# Patient Record
Sex: MALE | Race: WHITE | NOT HISPANIC OR LATINO
[De-identification: names, ages, dates, MRNs, and addresses within clinical notes are randomized per-mention and may not be internally consistent; named-entity substitution may affect disease eponyms.]

---

## 2019-03-04 ENCOUNTER — APPOINTMENT (OUTPATIENT)
Dept: ORTHOPEDIC SURGERY | Facility: CLINIC | Age: 69
End: 2019-03-04
Payer: MEDICARE

## 2019-03-04 VITALS
SYSTOLIC BLOOD PRESSURE: 145 MMHG | BODY MASS INDEX: 35.55 KG/M2 | HEIGHT: 69 IN | WEIGHT: 240 LBS | DIASTOLIC BLOOD PRESSURE: 79 MMHG

## 2019-03-04 VITALS — BODY MASS INDEX: 35.55 KG/M2 | HEIGHT: 69 IN | WEIGHT: 240 LBS

## 2019-03-04 DIAGNOSIS — Z87.39 PERSONAL HISTORY OF OTHER DISEASES OF THE MUSCULOSKELETAL SYSTEM AND CONNECTIVE TISSUE: ICD-10-CM

## 2019-03-04 DIAGNOSIS — Z82.61 FAMILY HISTORY OF ARTHRITIS: ICD-10-CM

## 2019-03-04 PROCEDURE — 72110 X-RAY EXAM L-2 SPINE 4/>VWS: CPT

## 2019-03-04 PROCEDURE — 73562 X-RAY EXAM OF KNEE 3: CPT | Mod: RT

## 2019-03-04 PROCEDURE — 99204 OFFICE O/P NEW MOD 45 MIN: CPT

## 2019-03-04 NOTE — DISCUSSION/SUMMARY
[Medication Risks Reviewed] : Medication risks reviewed [Surgical risks reviewed] : Surgical risks reviewed [de-identified] : MRI prescription of the lumbar spine to rule out herniated nucleus pulposus and evaluate generative disc disease.\par Ultrasound-guided steroid joint injection description was provided to the patient today or diagnostic evaluation\par When patient feels that the pain is intolerable with activity of daily living, discussed the risks and benefits of a right total her placement.

## 2019-03-04 NOTE — PHYSICAL EXAM
[Antalgic] : antalgic [Coxalgic] : coxalgic [Normal RLE] : Right Lower Extremity: No scars, rashes, lesions, ulcers, skin intact [Normal LLE] : Left Lower Extremity: No scars, rashes, lesions, ulcers, skin intact [Normal Touch] : sensation intact for touch [UE/LE] : Sensory: Intact in bilateral upper & lower extremities [ALL] : dorsalis pedis, posterior tibial, femoral, popliteal, and radial 2+ and symmetric bilaterally [Normal] : Alert and in no acute distress [Sacroiliac Mike-Fabere Test Left Side] : negative Marisol [Left Hip Was Examined] : negative impingement test [Poor Appearance] : well-appearing [Acute Distress] : not in acute distress [Abl Affect] : with normal affect [de-identified] : Well healed surgical incision of the right TKR with no signs of cellulitis. Full range of motion of the right total knee replacement in full extension and flexion to 120° no instability is noted with tibial stress [de-identified] : Motion of the right hip demonstrating decreased passive and active range of motion with impingement noted upon internal rotation of the femur. Upon flexion of that right hip which immediately externally rotates upon flexion and painful with rotation. [de-identified] : Negative straight leg raise\par heel and toe walk is intact [de-identified] : Deniz BMI [de-identified] : Radiographs of the right knee were performed today. There are stable interfaces between bone and implants in the femur, tibia and patella  are well in stable positioning of the femoral, tibia and, patella components. Alignment of the femur with the tibia are good, and the alignment of the patella to the femoral groove are well aligned. There is no fracture, foreign body, subsidence, osteolysis, or notable effusions. \par \par AP pelvis and lateral right hip demonstrating grade 3 degenerative joint disease with decreased joint space in comparison to the left hip joint. Osteophytes extending out from both the inferior and superior aspect of the right hip joint. Noted to send lesion of the femoral head aligned well with the acetabulum subchondral cyst formation. No fractures

## 2019-03-04 NOTE — HISTORY OF PRESENT ILLNESS
[Worsening] : worsening [___ wks] : [unfilled] week(s) ago [4] : a minimum pain level of 4/10 [7] : a maximum pain level of 7/10 [Standing] : standing [Constant] : ~He/She~ states the symptoms seem to be constant [Bending] : worsened by bending [Direct Pressure] : worsened by direct pressure [Hip Movement] : worsened by hip movement [Walking] : worsened by walking [NSAIDs] : relieved by nonsteroidal anti-inflammatory drugs [Rest] : relieved by rest [de-identified] : Patient is known to us for status post right total knee replacement in approximately 2012. Initial visit today patient is coming in for complaints of right knee pain which has been going on for several weeks. No known injury. Takes Mobic or Aleve as needed with no relief of pain. Lower back pain and into the entire right leg. Patient is here to evaluate his right total knee replacement.

## 2019-03-06 ENCOUNTER — FORM ENCOUNTER (OUTPATIENT)
Age: 69
End: 2019-03-06

## 2019-03-07 ENCOUNTER — OUTPATIENT (OUTPATIENT)
Dept: OUTPATIENT SERVICES | Facility: HOSPITAL | Age: 69
LOS: 1 days | End: 2019-03-07
Payer: MEDICARE

## 2019-03-07 ENCOUNTER — APPOINTMENT (OUTPATIENT)
Dept: MRI IMAGING | Facility: CLINIC | Age: 69
End: 2019-03-07
Payer: MEDICARE

## 2019-03-07 ENCOUNTER — APPOINTMENT (OUTPATIENT)
Dept: RADIOLOGY | Facility: CLINIC | Age: 69
End: 2019-03-07
Payer: MEDICARE

## 2019-03-07 DIAGNOSIS — Z00.8 ENCOUNTER FOR OTHER GENERAL EXAMINATION: ICD-10-CM

## 2019-03-07 PROCEDURE — 27093 INJECTION FOR HIP X-RAY: CPT | Mod: RT

## 2019-03-07 PROCEDURE — 73525 CONTRAST X-RAY OF HIP: CPT

## 2019-03-07 PROCEDURE — 72148 MRI LUMBAR SPINE W/O DYE: CPT | Mod: 26

## 2019-03-07 PROCEDURE — 73525 CONTRAST X-RAY OF HIP: CPT | Mod: 26,RT

## 2019-03-07 PROCEDURE — 27093 INJECTION FOR HIP X-RAY: CPT

## 2019-03-07 PROCEDURE — 72148 MRI LUMBAR SPINE W/O DYE: CPT

## 2019-03-11 ENCOUNTER — APPOINTMENT (OUTPATIENT)
Dept: ORTHOPEDIC SURGERY | Facility: CLINIC | Age: 69
End: 2019-03-11
Payer: MEDICARE

## 2019-03-11 VITALS — HEIGHT: 69 IN | WEIGHT: 240 LBS | BODY MASS INDEX: 35.55 KG/M2

## 2019-03-11 DIAGNOSIS — M51.36 OTHER INTERVERTEBRAL DISC DEGENERATION, LUMBAR REGION: ICD-10-CM

## 2019-03-11 PROCEDURE — 99213 OFFICE O/P EST LOW 20 MIN: CPT

## 2019-03-11 RX ORDER — DICLOFENAC SODIUM 75 MG/1
75 TABLET, DELAYED RELEASE ORAL
Qty: 60 | Refills: 1 | Status: ACTIVE | COMMUNITY
Start: 2019-03-11 | End: 1900-01-01

## 2019-04-01 ENCOUNTER — APPOINTMENT (OUTPATIENT)
Dept: ORTHOPEDIC SURGERY | Facility: CLINIC | Age: 69
End: 2019-04-01
Payer: MEDICARE

## 2019-04-01 VITALS
HEIGHT: 69 IN | WEIGHT: 250 LBS | HEART RATE: 58 BPM | BODY MASS INDEX: 37.03 KG/M2 | SYSTOLIC BLOOD PRESSURE: 145 MMHG | DIASTOLIC BLOOD PRESSURE: 81 MMHG

## 2019-04-01 PROCEDURE — 99214 OFFICE O/P EST MOD 30 MIN: CPT

## 2019-04-01 RX ORDER — MELOXICAM 7.5 MG/1
7.5 TABLET ORAL
Qty: 30 | Refills: 0 | Status: ACTIVE | COMMUNITY
Start: 2019-01-08

## 2019-04-01 RX ORDER — AMLODIPINE BESYLATE 10 MG/1
10 TABLET ORAL
Qty: 90 | Refills: 0 | Status: ACTIVE | COMMUNITY
Start: 2019-01-08

## 2019-04-01 RX ORDER — SIMVASTATIN 40 MG/1
40 TABLET, FILM COATED ORAL
Qty: 90 | Refills: 0 | Status: ACTIVE | COMMUNITY
Start: 2019-01-08

## 2019-04-01 RX ORDER — METOPROLOL SUCCINATE 100 MG/1
100 TABLET, EXTENDED RELEASE ORAL
Qty: 90 | Refills: 0 | Status: ACTIVE | COMMUNITY
Start: 2019-01-08

## 2019-04-01 RX ORDER — HYDROCHLOROTHIAZIDE 12.5 MG/1
12.5 TABLET ORAL
Qty: 90 | Refills: 0 | Status: ACTIVE | COMMUNITY
Start: 2019-01-08

## 2019-04-01 RX ORDER — VALSARTAN 160 MG/1
160 TABLET, COATED ORAL
Qty: 90 | Refills: 0 | Status: ACTIVE | COMMUNITY
Start: 2019-03-21

## 2019-04-01 RX ORDER — BENAZEPRIL HYDROCHLORIDE 40 MG/1
40 TABLET, FILM COATED ORAL
Qty: 90 | Refills: 0 | Status: ACTIVE | COMMUNITY
Start: 2019-01-08

## 2019-04-01 NOTE — DISCUSSION/SUMMARY
[Medication Risks Reviewed] : Medication risks reviewed [Surgical risks reviewed] : Surgical risks reviewed [de-identified] : The patient is not ready for hip replacement surgery at this time we'll continue with conservative treatment an exercise and stretching and walking. The patient was cautioned against taking multiple anti-inflammatories at the same time given his cardiac history. Activity modifications were discussed with the patient. A referral was made for acupuncture. The patient will follow up with us as needed.

## 2019-04-01 NOTE — PHYSICAL EXAM
[Antalgic] : antalgic [UE/LE] : Sensory: Intact in bilateral upper & lower extremities [ALL] : dorsalis pedis, posterior tibial, femoral, popliteal, and radial 2+ and symmetric bilaterally [Normal RLE] : Right Lower Extremity: No scars, rashes, lesions, ulcers, skin intact [Normal LLE] : Left Lower Extremity: No scars, rashes, lesions, ulcers, skin intact [Normal DTR Reflexes] : DTR reflexes normal [Normal Touch] : sensation intact for touch [Poor Appearance] : well-appearing [Acute Distress] : not in acute distress [Normal] : No costovertebral angle tenderness and no spinal tenderness [de-identified] : Positive right hip Fabers test

## 2019-05-08 ENCOUNTER — APPOINTMENT (OUTPATIENT)
Dept: ORTHOPEDIC SURGERY | Facility: CLINIC | Age: 69
End: 2019-05-08
Payer: MEDICARE

## 2019-05-08 VITALS
HEIGHT: 69 IN | SYSTOLIC BLOOD PRESSURE: 143 MMHG | DIASTOLIC BLOOD PRESSURE: 76 MMHG | WEIGHT: 250 LBS | BODY MASS INDEX: 37.03 KG/M2 | HEART RATE: 62 BPM

## 2019-05-08 PROCEDURE — 99214 OFFICE O/P EST MOD 30 MIN: CPT

## 2019-05-13 ENCOUNTER — APPOINTMENT (OUTPATIENT)
Dept: ORTHOPEDIC SURGERY | Facility: CLINIC | Age: 69
End: 2019-05-13

## 2019-05-28 ENCOUNTER — APPOINTMENT (OUTPATIENT)
Dept: ORTHOPEDIC SURGERY | Facility: CLINIC | Age: 69
End: 2019-05-28
Payer: MEDICARE

## 2019-05-28 VITALS
BODY MASS INDEX: 37.03 KG/M2 | SYSTOLIC BLOOD PRESSURE: 134 MMHG | WEIGHT: 250 LBS | HEART RATE: 58 BPM | DIASTOLIC BLOOD PRESSURE: 69 MMHG | HEIGHT: 69 IN

## 2019-05-28 DIAGNOSIS — M16.11 UNILATERAL PRIMARY OSTEOARTHRITIS, RIGHT HIP: ICD-10-CM

## 2019-05-28 PROCEDURE — 73502 X-RAY EXAM HIP UNI 2-3 VIEWS: CPT

## 2019-05-28 PROCEDURE — 99214 OFFICE O/P EST MOD 30 MIN: CPT

## 2019-06-03 ENCOUNTER — OUTPATIENT (OUTPATIENT)
Dept: OUTPATIENT SERVICES | Facility: HOSPITAL | Age: 69
LOS: 1 days | End: 2019-06-03
Payer: MEDICARE

## 2019-06-03 VITALS
DIASTOLIC BLOOD PRESSURE: 70 MMHG | HEART RATE: 55 BPM | SYSTOLIC BLOOD PRESSURE: 148 MMHG | RESPIRATION RATE: 14 BRPM | WEIGHT: 250 LBS | OXYGEN SATURATION: 97 % | TEMPERATURE: 98 F | HEIGHT: 67 IN

## 2019-06-03 DIAGNOSIS — Z98.890 OTHER SPECIFIED POSTPROCEDURAL STATES: Chronic | ICD-10-CM

## 2019-06-03 DIAGNOSIS — Z96.651 PRESENCE OF RIGHT ARTIFICIAL KNEE JOINT: Chronic | ICD-10-CM

## 2019-06-03 DIAGNOSIS — M16.11 UNILATERAL PRIMARY OSTEOARTHRITIS, RIGHT HIP: ICD-10-CM

## 2019-06-03 DIAGNOSIS — Z01.818 ENCOUNTER FOR OTHER PREPROCEDURAL EXAMINATION: ICD-10-CM

## 2019-06-03 DIAGNOSIS — M25.551 PAIN IN RIGHT HIP: ICD-10-CM

## 2019-06-03 LAB
ALBUMIN SERPL ELPH-MCNC: 4.3 G/DL — SIGNIFICANT CHANGE UP (ref 3.3–5)
ALP SERPL-CCNC: 74 U/L — SIGNIFICANT CHANGE UP (ref 30–120)
ALT FLD-CCNC: 27 U/L DA — SIGNIFICANT CHANGE UP (ref 10–60)
ANION GAP SERPL CALC-SCNC: 7 MMOL/L — SIGNIFICANT CHANGE UP (ref 5–17)
APTT BLD: 31.5 SEC — SIGNIFICANT CHANGE UP (ref 28.5–37)
AST SERPL-CCNC: 18 U/L — SIGNIFICANT CHANGE UP (ref 10–40)
BILIRUB SERPL-MCNC: 0.8 MG/DL — SIGNIFICANT CHANGE UP (ref 0.2–1.2)
BUN SERPL-MCNC: 18 MG/DL — SIGNIFICANT CHANGE UP (ref 7–23)
CALCIUM SERPL-MCNC: 9.3 MG/DL — SIGNIFICANT CHANGE UP (ref 8.4–10.5)
CHLORIDE SERPL-SCNC: 99 MMOL/L — SIGNIFICANT CHANGE UP (ref 96–108)
CO2 SERPL-SCNC: 30 MMOL/L — SIGNIFICANT CHANGE UP (ref 22–31)
CREAT SERPL-MCNC: 0.76 MG/DL — SIGNIFICANT CHANGE UP (ref 0.5–1.3)
GLUCOSE SERPL-MCNC: 102 MG/DL — HIGH (ref 70–99)
HCT VFR BLD CALC: 43 % — SIGNIFICANT CHANGE UP (ref 39–50)
HGB BLD-MCNC: 14.6 G/DL — SIGNIFICANT CHANGE UP (ref 13–17)
INR BLD: 1.04 RATIO — SIGNIFICANT CHANGE UP (ref 0.88–1.16)
MCHC RBC-ENTMCNC: 32.5 PG — SIGNIFICANT CHANGE UP (ref 27–34)
MCHC RBC-ENTMCNC: 34 GM/DL — SIGNIFICANT CHANGE UP (ref 32–36)
MCV RBC AUTO: 95.8 FL — SIGNIFICANT CHANGE UP (ref 80–100)
MRSA PCR RESULT.: SIGNIFICANT CHANGE UP
NRBC # BLD: 0 /100 WBCS — SIGNIFICANT CHANGE UP (ref 0–0)
PLATELET # BLD AUTO: 235 K/UL — SIGNIFICANT CHANGE UP (ref 150–400)
POTASSIUM SERPL-MCNC: 3.7 MMOL/L — SIGNIFICANT CHANGE UP (ref 3.5–5.3)
POTASSIUM SERPL-SCNC: 3.7 MMOL/L — SIGNIFICANT CHANGE UP (ref 3.5–5.3)
PROT SERPL-MCNC: 7.5 G/DL — SIGNIFICANT CHANGE UP (ref 6–8.3)
PROTHROM AB SERPL-ACNC: 11.3 SEC — SIGNIFICANT CHANGE UP (ref 10–12.9)
RBC # BLD: 4.49 M/UL — SIGNIFICANT CHANGE UP (ref 4.2–5.8)
RBC # FLD: 13.3 % — SIGNIFICANT CHANGE UP (ref 10.3–14.5)
S AUREUS DNA NOSE QL NAA+PROBE: DETECTED
SODIUM SERPL-SCNC: 136 MMOL/L — SIGNIFICANT CHANGE UP (ref 135–145)
WBC # BLD: 7.47 K/UL — SIGNIFICANT CHANGE UP (ref 3.8–10.5)
WBC # FLD AUTO: 7.47 K/UL — SIGNIFICANT CHANGE UP (ref 3.8–10.5)

## 2019-06-03 PROCEDURE — 36415 COLL VENOUS BLD VENIPUNCTURE: CPT

## 2019-06-03 PROCEDURE — 93010 ELECTROCARDIOGRAM REPORT: CPT

## 2019-06-03 PROCEDURE — 85610 PROTHROMBIN TIME: CPT

## 2019-06-03 PROCEDURE — 86901 BLOOD TYPING SEROLOGIC RH(D): CPT

## 2019-06-03 PROCEDURE — 80053 COMPREHEN METABOLIC PANEL: CPT

## 2019-06-03 PROCEDURE — 87640 STAPH A DNA AMP PROBE: CPT

## 2019-06-03 PROCEDURE — 87641 MR-STAPH DNA AMP PROBE: CPT

## 2019-06-03 PROCEDURE — 85730 THROMBOPLASTIN TIME PARTIAL: CPT

## 2019-06-03 PROCEDURE — 93005 ELECTROCARDIOGRAM TRACING: CPT

## 2019-06-03 PROCEDURE — 86850 RBC ANTIBODY SCREEN: CPT

## 2019-06-03 PROCEDURE — 86900 BLOOD TYPING SEROLOGIC ABO: CPT

## 2019-06-03 PROCEDURE — 85027 COMPLETE CBC AUTOMATED: CPT

## 2019-06-03 PROCEDURE — G0463: CPT

## 2019-06-03 NOTE — H&P PST ADULT - HISTORY OF PRESENT ILLNESS
this is a 67 y/o male who has had right knee, hip, buttocks pain for about 3 months; tests show right hip is worn out, bone on bone; to have right total hip replacement; takes meloxicam or tylenol for pain

## 2019-06-03 NOTE — H&P PST ADULT - NSICDXPROBLEM_GEN_ALL_CORE_FT
PROBLEM DIAGNOSES  Problem: Right hip pain  Assessment and Plan: right total hip replacement, anterior approach PROBLEM DIAGNOSES  Problem: At risk for sleep apnea  Assessment and Plan: stop bang 5-ROCIO precautions    Problem: Right hip pain  Assessment and Plan: right total hip replacement, anterior approach

## 2019-06-03 NOTE — H&P PST ADULT - NSICDXPASTSURGICALHX_GEN_ALL_CORE_FT
PAST SURGICAL HISTORY:  H/O knee surgery left knee reconstruction 1974    S/P arthroscopy of right knee     S/P total knee replacement, right 2011

## 2019-06-03 NOTE — H&P PST ADULT - NSICDXFAMILYHX_GEN_ALL_CORE_FT
FAMILY HISTORY:  Family history of CHF (congestive heart failure), mother   Family history of heart attack, father age 42  FH: type 2 diabetes, mother  FHx: heart disease, 2 brothers

## 2019-06-04 DIAGNOSIS — Z91.89 OTHER SPECIFIED PERSONAL RISK FACTORS, NOT ELSEWHERE CLASSIFIED: ICD-10-CM

## 2019-06-04 RX ORDER — MUPIROCIN 20 MG/G
1 OINTMENT TOPICAL
Qty: 1 | Refills: 0
Start: 2019-06-04 | End: 2019-06-08

## 2019-06-04 NOTE — PROGRESS NOTE ADULT - SUBJECTIVE AND OBJECTIVE BOX
Nose culture positive for MSSA. Mupirocin ointment 2% escribed and sent to patient's pharmacy and to be used twice a day for 5 consecutive days. Left a message for patient to call back

## 2019-06-04 NOTE — PROVIDER CONTACT NOTE (OTHER) - SITUATION
Patient called back re: nasal culture + MSSA. Instructed patient re: use of mupiricin nasal ointment twice daily for 5 consecutive days and documentation of same. Patient verbalizes understanding.

## 2019-06-13 ENCOUNTER — APPOINTMENT (OUTPATIENT)
Dept: ORTHOPEDIC SURGERY | Facility: HOSPITAL | Age: 69
End: 2019-06-13

## 2019-06-17 RX ORDER — SENNA PLUS 8.6 MG/1
2 TABLET ORAL AT BEDTIME
Refills: 0 | Status: DISCONTINUED | OUTPATIENT
Start: 2019-06-19 | End: 2019-06-21

## 2019-06-17 RX ORDER — ONDANSETRON 8 MG/1
4 TABLET, FILM COATED ORAL EVERY 6 HOURS
Refills: 0 | Status: DISCONTINUED | OUTPATIENT
Start: 2019-06-19 | End: 2019-06-21

## 2019-06-17 RX ORDER — DOCUSATE SODIUM 100 MG
100 CAPSULE ORAL THREE TIMES A DAY
Refills: 0 | Status: DISCONTINUED | OUTPATIENT
Start: 2019-06-19 | End: 2019-06-21

## 2019-06-17 RX ORDER — PANTOPRAZOLE SODIUM 20 MG/1
40 TABLET, DELAYED RELEASE ORAL
Refills: 0 | Status: DISCONTINUED | OUTPATIENT
Start: 2019-06-19 | End: 2019-06-21

## 2019-06-17 RX ORDER — MAGNESIUM HYDROXIDE 400 MG/1
30 TABLET, CHEWABLE ORAL DAILY
Refills: 0 | Status: DISCONTINUED | OUTPATIENT
Start: 2019-06-19 | End: 2019-06-21

## 2019-06-17 RX ORDER — POLYETHYLENE GLYCOL 3350 17 G/17G
17 POWDER, FOR SOLUTION ORAL DAILY
Refills: 0 | Status: DISCONTINUED | OUTPATIENT
Start: 2019-06-19 | End: 2019-06-21

## 2019-06-19 ENCOUNTER — INPATIENT (INPATIENT)
Facility: HOSPITAL | Age: 69
LOS: 1 days | Discharge: HOME | DRG: 470 | End: 2019-06-21
Attending: ORTHOPAEDIC SURGERY | Admitting: ORTHOPAEDIC SURGERY
Payer: MEDICARE

## 2019-06-19 ENCOUNTER — RESULT REVIEW (OUTPATIENT)
Age: 69
End: 2019-06-19

## 2019-06-19 ENCOUNTER — APPOINTMENT (OUTPATIENT)
Dept: ORTHOPEDIC SURGERY | Facility: HOSPITAL | Age: 69
End: 2019-06-19

## 2019-06-19 VITALS
HEART RATE: 64 BPM | HEIGHT: 67 IN | RESPIRATION RATE: 31 BRPM | TEMPERATURE: 98 F | OXYGEN SATURATION: 97 % | DIASTOLIC BLOOD PRESSURE: 68 MMHG | WEIGHT: 245.82 LBS | SYSTOLIC BLOOD PRESSURE: 160 MMHG

## 2019-06-19 DIAGNOSIS — Z01.818 ENCOUNTER FOR OTHER PREPROCEDURAL EXAMINATION: ICD-10-CM

## 2019-06-19 DIAGNOSIS — Z96.651 PRESENCE OF RIGHT ARTIFICIAL KNEE JOINT: Chronic | ICD-10-CM

## 2019-06-19 DIAGNOSIS — M16.11 UNILATERAL PRIMARY OSTEOARTHRITIS, RIGHT HIP: ICD-10-CM

## 2019-06-19 DIAGNOSIS — Z98.890 OTHER SPECIFIED POSTPROCEDURAL STATES: Chronic | ICD-10-CM

## 2019-06-19 PROBLEM — E78.5 HYPERLIPIDEMIA, UNSPECIFIED: Chronic | Status: ACTIVE | Noted: 2019-06-03

## 2019-06-19 PROBLEM — M19.90 UNSPECIFIED OSTEOARTHRITIS, UNSPECIFIED SITE: Chronic | Status: ACTIVE | Noted: 2019-06-03

## 2019-06-19 PROBLEM — I10 ESSENTIAL (PRIMARY) HYPERTENSION: Chronic | Status: ACTIVE | Noted: 2019-06-03

## 2019-06-19 LAB
ANION GAP SERPL CALC-SCNC: 2 MMOL/L — LOW (ref 5–17)
BUN SERPL-MCNC: 15 MG/DL — SIGNIFICANT CHANGE UP (ref 7–23)
CALCIUM SERPL-MCNC: 8.3 MG/DL — LOW (ref 8.4–10.5)
CHLORIDE SERPL-SCNC: 101 MMOL/L — SIGNIFICANT CHANGE UP (ref 96–108)
CO2 SERPL-SCNC: 31 MMOL/L — SIGNIFICANT CHANGE UP (ref 22–31)
CREAT SERPL-MCNC: 0.55 MG/DL — SIGNIFICANT CHANGE UP (ref 0.5–1.3)
GLUCOSE SERPL-MCNC: 126 MG/DL — HIGH (ref 70–99)
HCT VFR BLD CALC: 35.2 % — LOW (ref 39–50)
HGB BLD-MCNC: 11.9 G/DL — LOW (ref 13–17)
MCHC RBC-ENTMCNC: 33.1 PG — SIGNIFICANT CHANGE UP (ref 27–34)
MCHC RBC-ENTMCNC: 33.8 GM/DL — SIGNIFICANT CHANGE UP (ref 32–36)
MCV RBC AUTO: 97.8 FL — SIGNIFICANT CHANGE UP (ref 80–100)
NRBC # BLD: 0 /100 WBCS — SIGNIFICANT CHANGE UP (ref 0–0)
PLATELET # BLD AUTO: 252 K/UL — SIGNIFICANT CHANGE UP (ref 150–400)
POTASSIUM SERPL-MCNC: 3.7 MMOL/L — SIGNIFICANT CHANGE UP (ref 3.5–5.3)
POTASSIUM SERPL-SCNC: 3.7 MMOL/L — SIGNIFICANT CHANGE UP (ref 3.5–5.3)
RBC # BLD: 3.6 M/UL — LOW (ref 4.2–5.8)
RBC # FLD: 13.4 % — SIGNIFICANT CHANGE UP (ref 10.3–14.5)
SODIUM SERPL-SCNC: 134 MMOL/L — LOW (ref 135–145)
WBC # BLD: 13.58 K/UL — HIGH (ref 3.8–10.5)
WBC # FLD AUTO: 13.58 K/UL — HIGH (ref 3.8–10.5)

## 2019-06-19 PROCEDURE — 88311 DECALCIFY TISSUE: CPT | Mod: 26

## 2019-06-19 PROCEDURE — 27130 TOTAL HIP ARTHROPLASTY: CPT | Mod: RT

## 2019-06-19 PROCEDURE — 99223 1ST HOSP IP/OBS HIGH 75: CPT

## 2019-06-19 PROCEDURE — 88305 TISSUE EXAM BY PATHOLOGIST: CPT | Mod: 26

## 2019-06-19 RX ORDER — PANTOPRAZOLE SODIUM 20 MG/1
1 TABLET, DELAYED RELEASE ORAL
Qty: 30 | Refills: 1
Start: 2019-06-19 | End: 2019-08-17

## 2019-06-19 RX ORDER — OXYCODONE HYDROCHLORIDE 5 MG/1
10 TABLET ORAL
Refills: 0 | Status: DISCONTINUED | OUTPATIENT
Start: 2019-06-19 | End: 2019-06-21

## 2019-06-19 RX ORDER — TRANEXAMIC ACID 100 MG/ML
1000 INJECTION, SOLUTION INTRAVENOUS ONCE
Refills: 0 | Status: COMPLETED | OUTPATIENT
Start: 2019-06-19 | End: 2019-06-19

## 2019-06-19 RX ORDER — METOPROLOL TARTRATE 50 MG
100 TABLET ORAL DAILY
Refills: 0 | Status: DISCONTINUED | OUTPATIENT
Start: 2019-06-19 | End: 2019-06-21

## 2019-06-19 RX ORDER — CEFAZOLIN SODIUM 1 G
2000 VIAL (EA) INJECTION ONCE
Refills: 0 | Status: COMPLETED | OUTPATIENT
Start: 2019-06-19 | End: 2019-06-19

## 2019-06-19 RX ORDER — ACETAMINOPHEN 500 MG
1000 TABLET ORAL ONCE
Refills: 0 | Status: COMPLETED | OUTPATIENT
Start: 2019-06-19 | End: 2019-06-19

## 2019-06-19 RX ORDER — METOPROLOL TARTRATE 50 MG
1 TABLET ORAL
Qty: 0 | Refills: 0 | DISCHARGE

## 2019-06-19 RX ORDER — HYDROMORPHONE HYDROCHLORIDE 2 MG/ML
0.5 INJECTION INTRAMUSCULAR; INTRAVENOUS; SUBCUTANEOUS
Refills: 0 | Status: DISCONTINUED | OUTPATIENT
Start: 2019-06-19 | End: 2019-06-21

## 2019-06-19 RX ORDER — AMLODIPINE BESYLATE 2.5 MG/1
10 TABLET ORAL DAILY
Refills: 0 | Status: DISCONTINUED | OUTPATIENT
Start: 2019-06-19 | End: 2019-06-21

## 2019-06-19 RX ORDER — SODIUM CHLORIDE 9 MG/ML
1000 INJECTION, SOLUTION INTRAVENOUS
Refills: 0 | Status: DISCONTINUED | OUTPATIENT
Start: 2019-06-19 | End: 2019-06-20

## 2019-06-19 RX ORDER — MELOXICAM 15 MG/1
15 TABLET ORAL DAILY
Refills: 0 | Status: DISCONTINUED | OUTPATIENT
Start: 2019-06-20 | End: 2019-06-21

## 2019-06-19 RX ORDER — LISINOPRIL 2.5 MG/1
40 TABLET ORAL DAILY
Refills: 0 | Status: DISCONTINUED | OUTPATIENT
Start: 2019-06-21 | End: 2019-06-21

## 2019-06-19 RX ORDER — APREPITANT 80 MG/1
40 CAPSULE ORAL ONCE
Refills: 0 | Status: COMPLETED | OUTPATIENT
Start: 2019-06-19 | End: 2019-06-19

## 2019-06-19 RX ORDER — AMLODIPINE BESYLATE 2.5 MG/1
10 TABLET ORAL DAILY
Refills: 0 | Status: DISCONTINUED | OUTPATIENT
Start: 2019-06-19 | End: 2019-06-19

## 2019-06-19 RX ORDER — CHLORHEXIDINE GLUCONATE 213 G/1000ML
1 SOLUTION TOPICAL ONCE
Refills: 0 | Status: COMPLETED | OUTPATIENT
Start: 2019-06-19 | End: 2019-06-19

## 2019-06-19 RX ORDER — SIMVASTATIN 20 MG/1
1 TABLET, FILM COATED ORAL
Qty: 0 | Refills: 0 | DISCHARGE

## 2019-06-19 RX ORDER — SIMVASTATIN 20 MG/1
40 TABLET, FILM COATED ORAL AT BEDTIME
Refills: 0 | Status: DISCONTINUED | OUTPATIENT
Start: 2019-06-19 | End: 2019-06-21

## 2019-06-19 RX ORDER — ASPIRIN/CALCIUM CARB/MAGNESIUM 324 MG
81 TABLET ORAL EVERY 12 HOURS
Refills: 0 | Status: DISCONTINUED | OUTPATIENT
Start: 2019-06-20 | End: 2019-06-21

## 2019-06-19 RX ORDER — CEFAZOLIN SODIUM 1 G
2000 VIAL (EA) INJECTION EVERY 8 HOURS
Refills: 0 | Status: COMPLETED | OUTPATIENT
Start: 2019-06-19 | End: 2019-06-20

## 2019-06-19 RX ORDER — OXYCODONE HYDROCHLORIDE 5 MG/1
5 TABLET ORAL
Refills: 0 | Status: DISCONTINUED | OUTPATIENT
Start: 2019-06-19 | End: 2019-06-21

## 2019-06-19 RX ORDER — AMLODIPINE BESYLATE 2.5 MG/1
1 TABLET ORAL
Qty: 0 | Refills: 0 | DISCHARGE

## 2019-06-19 RX ORDER — ACETAMINOPHEN 500 MG
1000 TABLET ORAL EVERY 8 HOURS
Refills: 0 | Status: DISCONTINUED | OUTPATIENT
Start: 2019-06-20 | End: 2019-06-21

## 2019-06-19 RX ORDER — ASPIRIN/CALCIUM CARB/MAGNESIUM 324 MG
1 TABLET ORAL
Qty: 82 | Refills: 0
Start: 2019-06-19 | End: 2019-07-29

## 2019-06-19 RX ORDER — SODIUM CHLORIDE 9 MG/ML
1000 INJECTION, SOLUTION INTRAVENOUS
Refills: 0 | Status: DISCONTINUED | OUTPATIENT
Start: 2019-06-19 | End: 2019-06-19

## 2019-06-19 RX ORDER — ACETAMINOPHEN 500 MG
1000 TABLET ORAL EVERY 6 HOURS
Refills: 0 | Status: COMPLETED | OUTPATIENT
Start: 2019-06-19 | End: 2019-06-20

## 2019-06-19 RX ORDER — BENAZEPRIL HYDROCHLORIDE 40 MG/1
1 TABLET ORAL
Qty: 0 | Refills: 0 | DISCHARGE

## 2019-06-19 RX ORDER — ONDANSETRON 8 MG/1
4 TABLET, FILM COATED ORAL ONCE
Refills: 0 | Status: DISCONTINUED | OUTPATIENT
Start: 2019-06-19 | End: 2019-06-19

## 2019-06-19 RX ORDER — HYDROMORPHONE HYDROCHLORIDE 2 MG/ML
0.5 INJECTION INTRAMUSCULAR; INTRAVENOUS; SUBCUTANEOUS
Refills: 0 | Status: DISCONTINUED | OUTPATIENT
Start: 2019-06-19 | End: 2019-06-19

## 2019-06-19 RX ADMIN — HYDROMORPHONE HYDROCHLORIDE 0.5 MILLIGRAM(S): 2 INJECTION INTRAMUSCULAR; INTRAVENOUS; SUBCUTANEOUS at 11:22

## 2019-06-19 RX ADMIN — HYDROMORPHONE HYDROCHLORIDE 0.5 MILLIGRAM(S): 2 INJECTION INTRAMUSCULAR; INTRAVENOUS; SUBCUTANEOUS at 11:36

## 2019-06-19 RX ADMIN — Medication 1000 MILLIGRAM(S): at 16:37

## 2019-06-19 RX ADMIN — CHLORHEXIDINE GLUCONATE 1 APPLICATION(S): 213 SOLUTION TOPICAL at 07:31

## 2019-06-19 RX ADMIN — HYDROMORPHONE HYDROCHLORIDE 0.5 MILLIGRAM(S): 2 INJECTION INTRAMUSCULAR; INTRAVENOUS; SUBCUTANEOUS at 11:39

## 2019-06-19 RX ADMIN — OXYCODONE HYDROCHLORIDE 10 MILLIGRAM(S): 5 TABLET ORAL at 21:38

## 2019-06-19 RX ADMIN — Medication 1000 MILLIGRAM(S): at 22:49

## 2019-06-19 RX ADMIN — APREPITANT 40 MILLIGRAM(S): 80 CAPSULE ORAL at 07:31

## 2019-06-19 RX ADMIN — OXYCODONE HYDROCHLORIDE 10 MILLIGRAM(S): 5 TABLET ORAL at 16:06

## 2019-06-19 RX ADMIN — Medication 400 MILLIGRAM(S): at 22:14

## 2019-06-19 RX ADMIN — SODIUM CHLORIDE 125 MILLILITER(S): 9 INJECTION, SOLUTION INTRAVENOUS at 21:09

## 2019-06-19 RX ADMIN — Medication 100 MILLIGRAM(S): at 16:09

## 2019-06-19 RX ADMIN — SIMVASTATIN 40 MILLIGRAM(S): 20 TABLET, FILM COATED ORAL at 21:08

## 2019-06-19 RX ADMIN — SODIUM CHLORIDE 100 MILLILITER(S): 9 INJECTION, SOLUTION INTRAVENOUS at 15:06

## 2019-06-19 RX ADMIN — OXYCODONE HYDROCHLORIDE 10 MILLIGRAM(S): 5 TABLET ORAL at 16:36

## 2019-06-19 RX ADMIN — SODIUM CHLORIDE 100 MILLILITER(S): 9 INJECTION, SOLUTION INTRAVENOUS at 11:19

## 2019-06-19 RX ADMIN — HYDROMORPHONE HYDROCHLORIDE 0.5 MILLIGRAM(S): 2 INJECTION INTRAMUSCULAR; INTRAVENOUS; SUBCUTANEOUS at 12:00

## 2019-06-19 RX ADMIN — Medication 400 MILLIGRAM(S): at 16:07

## 2019-06-19 RX ADMIN — OXYCODONE HYDROCHLORIDE 10 MILLIGRAM(S): 5 TABLET ORAL at 21:08

## 2019-06-19 NOTE — CONSULT NOTE ADULT - SUBJECTIVE AND OBJECTIVE BOX
Information Obtained from:  EHR, Physical Chart, Patient at bedside (relevant EHR and Chart information verified with patient)    HPI:  67yo M admitted s/p RIGHT THR today.  Has had progressively worsening right hip pain with radiation into right buttock region and right knee, up to 8/10 with activity and certain movements at rest, partial relief with NSAIDS.  Pain recently affecting quality of life.     REVIEW OF SYSTEMS:  CONSTITUTIONAL: No fever, weight loss, or fatigue  EYES: No eye pain, visual disturbances, or discharge  ENMT:  No difficulty hearing, tinnitus, vertigo; No sinus or throat pain  NECK: No pain or stiffness  BREASTS: No pain, masses, or nipple discharge  RESPIRATORY: No cough, wheezing, chills or hemoptysis; No shortness of breath  CARDIOVASCULAR: No chest pain, palpitations, dizziness, or leg swelling  GASTROINTESTINAL: No abdominal or epigastric pain. No nausea, vomiting, or hematemesis; No diarrhea or constipation. No melena or hematochezia.  GENITOURINARY: No dysuria, frequency, hematuria, or incontinence  NEUROLOGICAL: No headaches, memory loss, loss of strength, numbness, or tremors  SKIN: No itching, burning, rashes, or lesions   LYMPH NODES: No enlarged glands  ENDOCRINE: No heat or cold intolerance; No hair loss  MUSCULOSKELETAL: No muscle or back pain  PSYCHIATRIC: No depression, anxiety, mood swings, or difficulty sleeping  HEME/LYMPH: No easy bruising, or bleeding gums  ALLERGY AND IMMUNOLOGIC: No hives or eczema    PAST MEDICAL & SURGICAL HISTORY:  Hyperlipidemia  Osteoarthritis  Hypertension  S/P arthroscopy of right knee  H/O knee surgery: left knee reconstruction   S/P total knee replacement, right:       SOCIAL HISTORY:  Lives: with spouse  Smoking Hx: none  ETOH Hx:  1-2 glasses of wine daily on average  Illicit Drug Use:  None    Allergies    No Known Allergies    Intolerances     Home Medications:  amLODIPine 10 mg oral tablet: 1 tab(s) orally once a day (2019 07:29)  benazepril 40 mg oral tablet: 1 tab(s) orally once a day (2019 07:29)  hydroCHLOROthiazide 12.5 mg oral capsule: 1 cap(s) orally once a day (2019 07:29)  meloxicam 7.5 mg oral tablet: 1 tab(s) orally 2 times a day (2019 07:29)  Metoprolol Succinate  mg oral tablet, extended release: 1 tab(s) orally once a day (2019 07:29)  simvastatin 40 mg oral tablet: 1 tab(s) orally once a day (at bedtime) (2019 07:29)    FAMILY HISTORY:  FHx: heart disease: 2 brothers   FH: type 2 diabetes: mother  Family history of CHF (congestive heart failure): mother   Family history of heart attack: father age 42    PHYSICAL EXAM:  Vital Signs Last 24 Hrs  T(C): 36.6 (2019 10:55), Max: 36.8 (2019 07:18)  T(F): 97.9 (2019 10:55), Max: 98.3 (2019 07:18)  HR: 61 (2019 11:15) (60 - 64)  BP: 142/69 (2019 11:15) (122/61 - 160/68)  BP(mean): --  RR: 16 (2019 11:15) (16 - 31)  SpO2: 99% (2019 11:15) (97% - 99%)    GENERAL: NAD, well-groomed, well-developed, awake, alert, oriented x 3, fluent and coherent speech  EYES: EOMI, PERRLA, conjunctiva and sclera clear  ENMT: No tonsillar erythema, exudates, or enlargement; Moist mucous membranes, Good dentition, No lesions  NECK: Supple, No JVD, No Cervical LAD, No thyromegaly, No thyroid nodules  NERVOUS SYSTEM:  Good concentration; No facial droop  CHEST WALL: No masses  CHEST/LUNG: Clear to auscultation bilaterally; No rales, rhonchi, wheezing, or rubs  HEART: Regular rate and rhythm; No murmurs, rubs, or gallops  ABDOMEN: Soft, Nontender, Nondistended, Bowel sounds present, No palpable masses or organomegaly, No bruits  EXTREMITIES:  2+ Peripheral Pulses, No clubbing, cyanosis, or edema  INCISION:  Dressing clean/dry/intact        EKG (was personally reviewed): sinus yue at 53bpm, 1st Degree A-V delay, LAFB

## 2019-06-19 NOTE — PHYSICAL THERAPY INITIAL EVALUATION ADULT - ADDITIONAL COMMENTS
Pt lives in a two-story house with his wife, with 3 PRITESH, no handrails. Pt states that he can enter through the garage, with 1+1 PRITESH. Pt's bedroom is on 2nd floor, with a 13 step curved staircase. Pt does not have RW at home.

## 2019-06-19 NOTE — BRIEF OPERATIVE NOTE - NSICDXBRIEFPROCEDURE_GEN_ALL_CORE_FT
PROCEDURES:  Total replacement of right hip joint by anterior approach 19-Jun-2019 07:17:13  Murray Cooper

## 2019-06-19 NOTE — CONSULT NOTE ADULT - ASSESSMENT
POD#0 s/p RIGHT THR  - Pain control  - Bowel regimen  - continue Mobic instead of starting celebrex (takes mobic at home)  - PT/OT  - VTE PPx-  ASA BID    HTN  - continue amlodipine and Metoprolol ER with hold params  - hold HCTZ   - resume ACEI on POD#2    HLD  - continue statin    Obesity  - diet/portion control  - exercise program once rehabbed from surgery

## 2019-06-19 NOTE — PHYSICAL THERAPY INITIAL EVALUATION ADULT - GENERAL OBSERVATIONS, REHAB EVAL
Pt received semi-supine in bed, +IV, +hemovac, +SCDs, wife at bedside Pt received semi-supine in bed, +IV, +hemovac, +right foam leg positioners, +SCDs, wife at bedside

## 2019-06-19 NOTE — PHYSICAL THERAPY INITIAL EVALUATION ADULT - RANGE OF MOTION EXAMINATION, REHAB EVAL
Left LE ROM was WFL (within functional limits)/bilateral upper extremity ROM was WFL (within functional limits)/right LE not assessed due to surgery/deficits as listed below

## 2019-06-20 ENCOUNTER — TRANSCRIPTION ENCOUNTER (OUTPATIENT)
Age: 69
End: 2019-06-20

## 2019-06-20 LAB
ANION GAP SERPL CALC-SCNC: 5 MMOL/L — SIGNIFICANT CHANGE UP (ref 5–17)
BUN SERPL-MCNC: 11 MG/DL — SIGNIFICANT CHANGE UP (ref 7–23)
CALCIUM SERPL-MCNC: 8.9 MG/DL — SIGNIFICANT CHANGE UP (ref 8.4–10.5)
CHLORIDE SERPL-SCNC: 99 MMOL/L — SIGNIFICANT CHANGE UP (ref 96–108)
CO2 SERPL-SCNC: 31 MMOL/L — SIGNIFICANT CHANGE UP (ref 22–31)
CREAT SERPL-MCNC: 0.65 MG/DL — SIGNIFICANT CHANGE UP (ref 0.5–1.3)
GLUCOSE SERPL-MCNC: 107 MG/DL — HIGH (ref 70–99)
HCT VFR BLD CALC: 33.5 % — LOW (ref 39–50)
HGB BLD-MCNC: 11.2 G/DL — LOW (ref 13–17)
MCHC RBC-ENTMCNC: 33.3 PG — SIGNIFICANT CHANGE UP (ref 27–34)
MCHC RBC-ENTMCNC: 33.4 GM/DL — SIGNIFICANT CHANGE UP (ref 32–36)
MCV RBC AUTO: 99.7 FL — SIGNIFICANT CHANGE UP (ref 80–100)
NRBC # BLD: 0 /100 WBCS — SIGNIFICANT CHANGE UP (ref 0–0)
PLATELET # BLD AUTO: 221 K/UL — SIGNIFICANT CHANGE UP (ref 150–400)
POTASSIUM SERPL-MCNC: 4 MMOL/L — SIGNIFICANT CHANGE UP (ref 3.5–5.3)
POTASSIUM SERPL-SCNC: 4 MMOL/L — SIGNIFICANT CHANGE UP (ref 3.5–5.3)
RBC # BLD: 3.36 M/UL — LOW (ref 4.2–5.8)
RBC # FLD: 13.3 % — SIGNIFICANT CHANGE UP (ref 10.3–14.5)
SODIUM SERPL-SCNC: 135 MMOL/L — SIGNIFICANT CHANGE UP (ref 135–145)
WBC # BLD: 11.91 K/UL — HIGH (ref 3.8–10.5)
WBC # FLD AUTO: 11.91 K/UL — HIGH (ref 3.8–10.5)

## 2019-06-20 PROCEDURE — 99232 SBSQ HOSP IP/OBS MODERATE 35: CPT

## 2019-06-20 RX ORDER — MELOXICAM 15 MG/1
1 TABLET ORAL
Qty: 20 | Refills: 0
Start: 2019-06-20

## 2019-06-20 RX ORDER — OXYCODONE HYDROCHLORIDE 5 MG/1
1 TABLET ORAL
Qty: 42 | Refills: 0
Start: 2019-06-20

## 2019-06-20 RX ADMIN — SENNA PLUS 2 TABLET(S): 8.6 TABLET ORAL at 21:14

## 2019-06-20 RX ADMIN — SIMVASTATIN 40 MILLIGRAM(S): 20 TABLET, FILM COATED ORAL at 21:14

## 2019-06-20 RX ADMIN — Medication 81 MILLIGRAM(S): at 06:13

## 2019-06-20 RX ADMIN — OXYCODONE HYDROCHLORIDE 5 MILLIGRAM(S): 5 TABLET ORAL at 08:31

## 2019-06-20 RX ADMIN — Medication 100 MILLIGRAM(S): at 00:28

## 2019-06-20 RX ADMIN — Medication 100 MILLIGRAM(S): at 21:13

## 2019-06-20 RX ADMIN — OXYCODONE HYDROCHLORIDE 5 MILLIGRAM(S): 5 TABLET ORAL at 09:15

## 2019-06-20 RX ADMIN — AMLODIPINE BESYLATE 10 MILLIGRAM(S): 2.5 TABLET ORAL at 06:13

## 2019-06-20 RX ADMIN — MELOXICAM 15 MILLIGRAM(S): 15 TABLET ORAL at 08:31

## 2019-06-20 RX ADMIN — MELOXICAM 15 MILLIGRAM(S): 15 TABLET ORAL at 09:00

## 2019-06-20 RX ADMIN — OXYCODONE HYDROCHLORIDE 10 MILLIGRAM(S): 5 TABLET ORAL at 21:15

## 2019-06-20 RX ADMIN — Medication 100 MILLIGRAM(S): at 06:13

## 2019-06-20 RX ADMIN — OXYCODONE HYDROCHLORIDE 10 MILLIGRAM(S): 5 TABLET ORAL at 03:39

## 2019-06-20 RX ADMIN — PANTOPRAZOLE SODIUM 40 MILLIGRAM(S): 20 TABLET, DELAYED RELEASE ORAL at 06:13

## 2019-06-20 RX ADMIN — OXYCODONE HYDROCHLORIDE 10 MILLIGRAM(S): 5 TABLET ORAL at 20:45

## 2019-06-20 RX ADMIN — OXYCODONE HYDROCHLORIDE 10 MILLIGRAM(S): 5 TABLET ORAL at 12:16

## 2019-06-20 RX ADMIN — Medication 1000 MILLIGRAM(S): at 10:00

## 2019-06-20 RX ADMIN — Medication 1000 MILLIGRAM(S): at 18:33

## 2019-06-20 RX ADMIN — OXYCODONE HYDROCHLORIDE 10 MILLIGRAM(S): 5 TABLET ORAL at 13:00

## 2019-06-20 RX ADMIN — Medication 1000 MILLIGRAM(S): at 18:03

## 2019-06-20 RX ADMIN — Medication 400 MILLIGRAM(S): at 03:40

## 2019-06-20 RX ADMIN — Medication 1000 MILLIGRAM(S): at 09:15

## 2019-06-20 RX ADMIN — Medication 1000 MILLIGRAM(S): at 04:10

## 2019-06-20 RX ADMIN — OXYCODONE HYDROCHLORIDE 10 MILLIGRAM(S): 5 TABLET ORAL at 04:15

## 2019-06-20 RX ADMIN — Medication 100 MILLIGRAM(S): at 12:16

## 2019-06-20 RX ADMIN — Medication 81 MILLIGRAM(S): at 18:03

## 2019-06-20 NOTE — DISCHARGE NOTE PROVIDER - NSDCCPCAREPLAN_GEN_ALL_CORE_FT
PRINCIPAL DISCHARGE DIAGNOSIS  Diagnosis: Primary localized osteoarthritis of right hip  Assessment and Plan of Treatment: PT/OT Anterior Total Hip Protocol; Isometrics, Ambulation, transfers, stairs, & ADLs  Full weight bearing both legs; Walker/cane use as instructed by PT/OT  Anterior THR precautions for 6 weeks: No straight leg raise; No external rotation of hip when extended-standing or lying flat; No hyperextension of hip when standing (kickback)  Ice packs to hip for 30 min.every 3 hours & post P.T.  Prineo tape/suture removal on/near after Post Op Day # 14 in Surgeon's office.  No tub baths  Do not resume driving until you have your surgeons permission  Follow up with your primary care physician within 2-3 weeks of discharge home

## 2019-06-20 NOTE — DISCHARGE NOTE NURSING/CASE MANAGEMENT/SOCIAL WORK - NSSCCONTNUM_GEN_ALL_CORE
Please contact the home care agency at  [phone] if you have not heard from them by 12 noon on the day after your hospital discharge.

## 2019-06-20 NOTE — DISCHARGE NOTE PROVIDER - CARE PROVIDER_API CALL
Drew Saenz)  Orthopedics  [address], [facility] [address]  [place] [address]  Phone: [phone]  Fax: [phone]  Follow Up Time:

## 2019-06-20 NOTE — DISCHARGE NOTE PROVIDER - NSDCACTIVITY_GEN_ALL_CORE
Walking - Indoors allowed/No heavy lifting/straining/Stairs allowed/Do not drive or operate machinery

## 2019-06-20 NOTE — DISCHARGE NOTE PROVIDER - HOSPITAL COURSE
RONNIE MCNAIR        Admitted on 06-19-19         68y y.o.  Male with history of DJD (degenerative joint disease)        Surgery:   Total replacement of right hip joint by anterior approach        Orthopedic Surgeon:   Dr. DARI Saenz        Martina-operative antibiotic:      ceFAZolin   IVPB:,             Consulted Services : Hospitalist, Physical Therapy, Occupational Therapy        Typical Physical & occupational therapy modalities post Total replacement of right hip joint by anterior approach     were performed including ambulation training, range of motion, ADL's, and transfers.         DVT prophylaxis:  aspirin enteric coated: 81 milliGRAM(s)             The patient had a clean appearing surgical incision with no sign of surgical site infections and had a stable neuro / vascular exam of the operated extremity.  After progression of mobility guided by the PT/ OT staff,  the patient was felt to benefit from further rehabilitative care for restoration to level of function. This was felt to best be accomplished at Home.        Discharge and Orthopedic Care instructions were delineated in the Discharge Plan and reviewed with the patient. All medications were delineated in the medication reconciliation tool and key points were reviewed with the patient.         This patient was deemed stable from an Orthopedic & medical standpoint for discharge today.    He will follow up with Dr. DARI Saenz for further Orthopedic care.         Patient Discharged with Following prescriptions:      aspirin 81 mg oral delayed release tablet: 1 tab(s) orally every 12 hours. Take 2 hours before Meloxicam    Hip Kit:     meloxicam 15 mg oral tablet: 1 tab(s) orally once a day    mupirocin 2% topical ointment: 1 application in each nostril 2 times a day     oxyCODONE 5 mg oral tablet: 1 tab(s) orally every 3 hours, As needed, Mild Pain (1 - 3), 2 tabs every 3 hours as needed moderate pain MDD:6    pantoprazole 40 mg oral delayed release tablet: 1 tab(s) orally once a day (before a meal)    Rolling Walker with 5 inch Wheels:

## 2019-06-20 NOTE — DISCHARGE NOTE NURSING/CASE MANAGEMENT/SOCIAL WORK - NSDCDPATPORTLINK_GEN_ALL_CORE
You can access the [facility], offered by [facility], by registering with the following website: [email or web address]

## 2019-06-20 NOTE — DISCHARGE NOTE NURSING/CASE MANAGEMENT/SOCIAL WORK - NSDCDMENAME_GEN_ALL_CORE_FT
Rolling Walker ordered from [facility] [phone] |  Hip Kit ordered from [employer] [phone]

## 2019-06-20 NOTE — OCCUPATIONAL THERAPY INITIAL EVALUATION ADULT - ADDITIONAL COMMENTS
Lives with his wife. 3 steps to enter, 13 inside. Stall shower. has a commode. Will need RW and hip kit upon d/c

## 2019-06-20 NOTE — DISCHARGE NOTE PROVIDER - NSDCHHNEEDSERVICE_GEN_ALL_CORE
Observation and assessment/Rehabilitation services/Teaching and training/Wound care and assessment/Medication teaching and assessment

## 2019-06-20 NOTE — OCCUPATIONAL THERAPY INITIAL EVALUATION ADULT - NS ASR FOLLOW COMMAND OT EVAL
100% of the time/Patient educated regarding fall prevention and home/hospital safety. Pt educated on use of AE/DME recommended for safety & the need to call for assistance. Patient with good understanding. Role of OT and patient goals discussed. Patient educated regarding diagnosis specific precautions and provided with educational folder including goals, expectations and ther ex. Pt educated on role and goal of OT. Discharge planning and recommendations reviewed with the patient. Case management/ Social work notified .

## 2019-06-21 VITALS
RESPIRATION RATE: 16 BRPM | OXYGEN SATURATION: 93 % | SYSTOLIC BLOOD PRESSURE: 106 MMHG | TEMPERATURE: 98 F | DIASTOLIC BLOOD PRESSURE: 56 MMHG | HEART RATE: 57 BPM

## 2019-06-21 LAB
ANION GAP SERPL CALC-SCNC: 5 MMOL/L — SIGNIFICANT CHANGE UP (ref 5–17)
BUN SERPL-MCNC: 11 MG/DL — SIGNIFICANT CHANGE UP (ref 7–23)
CALCIUM SERPL-MCNC: 8.7 MG/DL — SIGNIFICANT CHANGE UP (ref 8.4–10.5)
CHLORIDE SERPL-SCNC: 101 MMOL/L — SIGNIFICANT CHANGE UP (ref 96–108)
CO2 SERPL-SCNC: 30 MMOL/L — SIGNIFICANT CHANGE UP (ref 22–31)
CREAT SERPL-MCNC: 0.64 MG/DL — SIGNIFICANT CHANGE UP (ref 0.5–1.3)
GLUCOSE SERPL-MCNC: 103 MG/DL — HIGH (ref 70–99)
HCT VFR BLD CALC: 32.6 % — LOW (ref 39–50)
HGB BLD-MCNC: 10.8 G/DL — LOW (ref 13–17)
MCHC RBC-ENTMCNC: 33.1 GM/DL — SIGNIFICANT CHANGE UP (ref 32–36)
MCHC RBC-ENTMCNC: 33.2 PG — SIGNIFICANT CHANGE UP (ref 27–34)
MCV RBC AUTO: 100.3 FL — HIGH (ref 80–100)
NRBC # BLD: 0 /100 WBCS — SIGNIFICANT CHANGE UP (ref 0–0)
PLATELET # BLD AUTO: 222 K/UL — SIGNIFICANT CHANGE UP (ref 150–400)
POTASSIUM SERPL-MCNC: 3.8 MMOL/L — SIGNIFICANT CHANGE UP (ref 3.5–5.3)
POTASSIUM SERPL-SCNC: 3.8 MMOL/L — SIGNIFICANT CHANGE UP (ref 3.5–5.3)
RBC # BLD: 3.25 M/UL — LOW (ref 4.2–5.8)
RBC # FLD: 13.2 % — SIGNIFICANT CHANGE UP (ref 10.3–14.5)
SODIUM SERPL-SCNC: 136 MMOL/L — SIGNIFICANT CHANGE UP (ref 135–145)
WBC # BLD: 11.78 K/UL — HIGH (ref 3.8–10.5)
WBC # FLD AUTO: 11.78 K/UL — HIGH (ref 3.8–10.5)

## 2019-06-21 PROCEDURE — 76000 FLUOROSCOPY <1 HR PHYS/QHP: CPT

## 2019-06-21 PROCEDURE — 99239 HOSP IP/OBS DSCHRG MGMT >30: CPT

## 2019-06-21 PROCEDURE — 97116 GAIT TRAINING THERAPY: CPT

## 2019-06-21 PROCEDURE — C1889: CPT

## 2019-06-21 PROCEDURE — 36415 COLL VENOUS BLD VENIPUNCTURE: CPT

## 2019-06-21 PROCEDURE — 97530 THERAPEUTIC ACTIVITIES: CPT

## 2019-06-21 PROCEDURE — 85027 COMPLETE CBC AUTOMATED: CPT

## 2019-06-21 PROCEDURE — C1776: CPT

## 2019-06-21 PROCEDURE — 80048 BASIC METABOLIC PNL TOTAL CA: CPT

## 2019-06-21 PROCEDURE — 97165 OT EVAL LOW COMPLEX 30 MIN: CPT

## 2019-06-21 PROCEDURE — 97535 SELF CARE MNGMENT TRAINING: CPT

## 2019-06-21 PROCEDURE — 88311 DECALCIFY TISSUE: CPT

## 2019-06-21 PROCEDURE — 88305 TISSUE EXAM BY PATHOLOGIST: CPT

## 2019-06-21 PROCEDURE — C1713: CPT

## 2019-06-21 RX ORDER — MELOXICAM 15 MG/1
1 TABLET ORAL
Qty: 0 | Refills: 0 | DISCHARGE

## 2019-06-21 RX ORDER — DOCUSATE SODIUM 100 MG
1 CAPSULE ORAL
Qty: 0 | Refills: 0 | DISCHARGE
Start: 2019-06-21

## 2019-06-21 RX ORDER — ACETAMINOPHEN 500 MG
2 TABLET ORAL
Qty: 0 | Refills: 0 | DISCHARGE
Start: 2019-06-21

## 2019-06-21 RX ADMIN — Medication 81 MILLIGRAM(S): at 05:39

## 2019-06-21 RX ADMIN — Medication 1000 MILLIGRAM(S): at 04:38

## 2019-06-21 RX ADMIN — Medication 1000 MILLIGRAM(S): at 08:44

## 2019-06-21 RX ADMIN — OXYCODONE HYDROCHLORIDE 10 MILLIGRAM(S): 5 TABLET ORAL at 05:40

## 2019-06-21 RX ADMIN — AMLODIPINE BESYLATE 10 MILLIGRAM(S): 2.5 TABLET ORAL at 05:39

## 2019-06-21 RX ADMIN — MELOXICAM 15 MILLIGRAM(S): 15 TABLET ORAL at 08:48

## 2019-06-21 RX ADMIN — LISINOPRIL 40 MILLIGRAM(S): 2.5 TABLET ORAL at 05:39

## 2019-06-21 RX ADMIN — MELOXICAM 15 MILLIGRAM(S): 15 TABLET ORAL at 08:44

## 2019-06-21 RX ADMIN — OXYCODONE HYDROCHLORIDE 10 MILLIGRAM(S): 5 TABLET ORAL at 09:57

## 2019-06-21 RX ADMIN — Medication 100 MILLIGRAM(S): at 05:39

## 2019-06-21 RX ADMIN — PANTOPRAZOLE SODIUM 40 MILLIGRAM(S): 20 TABLET, DELAYED RELEASE ORAL at 05:39

## 2019-06-21 RX ADMIN — Medication 1000 MILLIGRAM(S): at 08:48

## 2019-06-21 RX ADMIN — Medication 1000 MILLIGRAM(S): at 04:40

## 2019-06-21 RX ADMIN — OXYCODONE HYDROCHLORIDE 10 MILLIGRAM(S): 5 TABLET ORAL at 05:10

## 2019-06-21 NOTE — PROGRESS NOTE ADULT - SUBJECTIVE AND OBJECTIVE BOX
INTERVAL HPI/OVERNIGHT EVENTS:   Patient seen and examined.  Eating, voiding, no BM yet.  No fevers, chills, sweats, dizziness, HA, changes in vision, cp, palpitations, sob, persistent cough, n/v/d, abd pain, dysuria, focal weakness, or calf pain.     REVIEW OF SYSTEMS:  See HPI,  all others negative    PHYSICAL EXAM:  Vital Signs Last 24 Hrs  T(C): 36.8 (2019 08:18), Max: 37.1 (2019 03:30)  T(F): 98.2 (2019 08:18), Max: 98.8 (2019 03:30)  HR: 60 (2019 08:18) (57 - 80)  BP: 122/72 (2019 08:18) (93/63 - 142/69)  BP(mean): --  RR: 17 (2019 08:18) (12 - 22)  SpO2: 95% (2019 08:18) (93% - 100%)    GENERAL: NAD, well-groomed, well-developed, awake, alert, oriented x 3, fluent and coherent speech  EYES: EOMI, PERRLA, conjunctiva and sclera clear  ENMT: No tonsillar erythema, exudates, or enlargement; Moist mucous membranes, Good dentition, No lesions  NECK: Supple, No JVD, No Cervical LAD, No thyromegaly, No thyroid nodules felt  NERVOUS SYSTEM:  Good concentration; Moving all 4 extremities; No gross sensory deficits, No facial droop  CHEST WALL: No masses  CHEST/LUNG: Clear to auscultation bilaterally; No rales, rhonchi, wheezing, or rubs  HEART: Regular rate and rhythm; No murmurs, rubs, or gallops  ABDOMEN: Soft, Nontender, Nondistended, Bowel sounds present, No palpable masses or organomegaly, No bruits  EXTREMITIES:  2+ Peripheral Pulses, No clubbing, cyanosis, or edema  INCISION: dressing c/d/i    Diagnostic Testin.9   13.58 )-----------( 252      ( 2019 17:13 )             35.2     2019 08:40    135    |  99     |  11     ----------------------------<  107    4.0     |  31     |  0.65     Ca    8.9        2019 08:40
Discharge medication calendar:  Aspirin EC 81mg q12h x 6 weeks  APAP 1000mg q8h x 2-3 weeks  Meloxicam 15mg QAM x 21 days  Pantoprazole 40mg QAM x 6 weeks  Narcotic PRN  Docusate 100mg TID while taking narcotic  Miralax, Senna, or Bisacodyl PRN for treatment of constipation
INTERVAL HPI/OVERNIGHT EVENTS:   Patient seen and examined.  Eating, voiding, no BM yet,+ flatus.  Drain removed and dressing changes this morning, no drainage.  No fevers, chills, sweats, dizziness, HA, changes in vision, cp, palpitations, sob, persistent cough, n/v/d, abd pain, dysuria, focal weakness, or calf pain.     REVIEW OF SYSTEMS:  See HPI,  all others negative    PHYSICAL EXAM:  Vital Signs Last 24 Hrs  T(C): 36.9 (21 Jun 2019 08:31), Max: 37.6 (20 Jun 2019 19:18)  T(F): 98.4 (21 Jun 2019 08:31), Max: 99.7 (20 Jun 2019 19:18)  HR: 80 (21 Jun 2019 08:31) (58 - 81)  BP: 92/51 (21 Jun 2019 08:31) (92/51 - 153/83)  BP(mean): --  RR: 16 (21 Jun 2019 08:31) (14 - 18)  SpO2: 97% (21 Jun 2019 08:31) (93% - 97%)    GENERAL: NAD, well-groomed, well-developed, awake, alert, oriented x 3, fluent and coherent speech  EYES: EOMI, PERRLA, conjunctiva and sclera clear  ENMT: No tonsillar erythema, exudates, or enlargement; Moist mucous membranes, Good dentition, No lesions  NECK: Supple, No JVD, No Cervical LAD   NERVOUS SYSTEM:  Good concentration; Moving all 4 extremities; No gross sensory deficits, No facial droop  CHEST/LUNG: Clear to auscultation bilaterally; No rales, rhonchi, wheezing, or rubs  HEART: Regular rate and rhythm; No murmurs, rubs, or gallops  ABDOMEN: Soft, Nontender, Nondistended, Bowel sounds present, No palpable masses or organomegaly, No bruits  EXTREMITIES:  2+ Peripheral Pulses, No clubbing, cyanosis, or edema  INCISION: dressing c/d/i, minimal periwound edema    Diagnostic Testing:                                   10.8   11.78 )-----------( 222      ( 21 Jun 2019 08:29 )             32.6     06-21    136  |  101  |  11  ----------------------------<  103<H>  3.8   |  30  |  0.64    Ca    8.7      21 Jun 2019 08:29
POST OPERATIVE NOTE    s/p Right THR Anterior Approach     Complains of minimal pain in right groin and at surgical site.   No headache, chest pain, shortness of breath or nausea.    Vital Signs Last 24 Hrs  T(C): 36.6 (19 Jun 2019 10:55), Max: 36.8 (19 Jun 2019 07:18)  T(F): 97.9 (19 Jun 2019 10:55), Max: 98.3 (19 Jun 2019 07:18)  HR: 67 (19 Jun 2019 14:30) (57 - 77)  BP: 113/60 (19 Jun 2019 14:30) (113/60 - 160/68)  BP(mean): --  RR: 16 (19 Jun 2019 14:30) (12 - 31)  SpO2: 96% (19 Jun 2019 14:30) (96% - 100%)    Labs:  To be drawn this afternoon        Physical: Surgical site dressing clean and dry.                   Foot sensate to light touch, +EHL, plantarflexion, dorsiflexion, +pedal pulse                   Calves soft, nontender         Allergies    No Known Allergies        A+P:  Orthopedically stable    Perioperative antibiotic x 24 hours    VTE prophylaxis Ecotrin    Physical and Occupational therapy- WBAT, Anterior hip precautions    Pain management - multimodal regimen       Medical care per hospitalist service     Discharge plan- home when stable and cleared by PT, Medicine and Ortho
Post Op     RONNIE MCNAIR      68y        Male                                                                                                                 T(C): 36.9 (06-21-19 @ 03:35), Max: 37.6 (06-20-19 @ 19:18)  HR: 81 (06-21-19 @ 05:42) (58 - 81)  BP: 143/76 (06-21-19 @ 05:42) (103/63 - 153/83)  RR: 14 (06-21-19 @ 05:42) (14 - 18)  SpO2: 95% (06-21-19 @ 05:42) (93% - 95%)  -    S/P total  hip replacement right anterior    Patient denies shortness of breath, chest pain, dyspnea, No complaints  Pain is 3 /10    Physical Exam    Extremity: Bilaterally:  No holmon                                           No Cord                                          PAS on                                          Neurovascular intact                                          Motor intact EHL/FHL                                          Sensation intact                                          Pulses intact DP/PT                                         Calves Soft                                         Dressing Clean / Dry / Intact changed hv removed  tegaderm applied, prineo  clean dry and intact                                          Capillary refill with 5 seconds                          11.2   11.91 )-----------( 221      ( 20 Jun 2019 12:55 )             33.5       06-20    135  |  99  |  11  ----------------------------<  107<H>  4.0   |  31  |  0.65    Ca    8.9      20 Jun 2019 08:40        A/P  -- S/P total  hip replacement right anterior    -  Medicine To Follow   - DVT prophylaxis PAS [id number] po bid  - PT & OT   - Analagesia pain protocol , mobic  for HO  patient has at home   - Incentive Spirometry  - Discharge Planning home today  - Safety Precautions  -  CBC , BMP daily
Procedure: Right Anterior THR  POD#: 1    S: Pt without complaints. No SOB,CP, N/V. Tolerated Fluids / Diet well.   Pain comfortable (3/10 ) on Interval Rx. No BM yet , + flatus, No abdominal pain.  Stepped with walker yesterday with PT  Pain Rx:   acetaminophen   Tablet .. 1000 milliGRAM(s) Oral every 8 hours  HYDROmorphone  Injectable 0.5 milliGRAM(s) IV Push every 3 hours PRN  meloxicam 15 milliGRAM(s) Oral daily  ondansetron Injectable 4 milliGRAM(s) IV Push every 6 hours PRN  oxyCODONE    IR 5 milliGRAM(s) Oral every 3 hours PRN  oxyCODONE    IR 10 milliGRAM(s) Oral every 3 hours PRN    O: General: Pt Alert and oriented, On exam NAD,   VS: Vital Signs Last 24 Hrs  T(C): 37.1 (20 Jun 2019 03:30), Max: 37.1 (20 Jun 2019 03:30)  T(F): 98.8 (20 Jun 2019 03:30), Max: 98.8 (20 Jun 2019 03:30)  HR: 80 (20 Jun 2019 06:11) (57 - 80)  BP: 137/77 (20 Jun 2019 06:11) (93/63 - 142/69)  RR: 18 (20 Jun 2019 03:30) (12 - 22)  SpO2: 98% (20 Jun 2019 03:30) (93% - 100%)  Lungs: BS clear bilat.  [Abdomen]: Soft; protuberant; no distention, benign exam    Ext( Right Ant. Hip): Aquacel / ABD  Dressing ;  clean, dry, & intact, Min. / Mod. STS;   ROM: Extension - 0 deg; Flexion - 50 deg.  Neurologic: Has sensation bilat. feet & toes ;  Full AROM bilat feet & toes. EHL / AT  = Bilat: 5/5 ; Sensation Present/min diminished mid lateral thigh  Vascular: Feet toes warm, pink. DP = 2+. Calves soft ; w/o tenderness bilat..  HVAC = 170 ml Overnight ;  Continue  VTEP: On Bilat. Venodynes +   aspirin enteric coated 81 milliGRAM(s) Oral every 12 hours    H.O Prophylaxis: MObic - Goal 21 Days     Labs yest.:                        11.9   13.58 )-----------( 252      ( 19 Jun 2019 17:13 )             35.2     06-19    134<L>  |  101  |  15  ----------------------------<  126<H>  3.7   |  31  |  0.55    Hospitalist input noted    Primary Orthopedic Assessment:  • Stable from Orthopedic perspective  • Neuro motor exam stable:   • Labs: CBC, Chem stable      Plan:   • Continue:  PT/OT/Weightbearing as tolerated with assistance of a walker/Anterior THR precautions/Ice to hip/          Incentive spirometry encouraged / Meloxicam for HO PPX  • Continue DVT prophylaxis as prescribed, including use of compression devices and ankle pumps  • Continue Pain Rx  • Anterior hip precautions reviewed with patient  • Plans per Medicine / Anesthesia   • Discharge planning – anticipated discharge is Home D/C with [facility]  when medically stable & cleared by PT/OT

## 2019-06-21 NOTE — PROGRESS NOTE ADULT - ASSESSMENT
POD#1 s/p RIGHT THR  - Pain control  - Bowel regimen  - continue Mobic instead of starting celebrex (takes mobic at home)  - PT/OT  - VTE PPx-  ASA BID    HTN  - continue amlodipine and Metoprolol ER with hold params  - hold HCTZ   - resume ACEI on POD#2  - BP acceptable    HLD  - continue statin    Obesity  - diet/portion control  - exercise program once rehabbed from surgery
POD#2 s/p RIGHT THR  - Pain control  - Bowel regimen  - continue Mobic instead of starting celebrex (takes mobic at home)  - PT/OT  - VTE PPx-  ASA BID  - Stable for DC from medical perspective    HTN  - continue amlodipine and Metoprolol ER with hold params  - resume HCTZ  upon discharge  - resume ACEI on POD#2  - BP acceptable    HLD  - continue statin    Obesity  - diet/portion control  - exercise program once rehabbed from surgery

## 2019-06-21 NOTE — PROGRESS NOTE ADULT - REASON FOR ADMISSION
Patient is a 68y old  Male who presents with a chief complaint of S/P Right Ant. THR 6/19 (20 Jun 2019 07:53)
Right THR Anterior approach
S/P Right Ant. THR 6/19
Patient is a 68y old  Male who presents with a chief complaint of S/P Right Ant. THR 6/19 (20 Jun 2019 07:53)

## 2019-07-05 ENCOUNTER — APPOINTMENT (OUTPATIENT)
Dept: ORTHOPEDIC SURGERY | Facility: CLINIC | Age: 69
End: 2019-07-05
Payer: MEDICARE

## 2019-07-05 VITALS
HEIGHT: 69 IN | TEMPERATURE: 98.2 F | WEIGHT: 248 LBS | BODY MASS INDEX: 36.73 KG/M2 | SYSTOLIC BLOOD PRESSURE: 132 MMHG | HEART RATE: 64 BPM | DIASTOLIC BLOOD PRESSURE: 84 MMHG

## 2019-07-05 PROCEDURE — 99024 POSTOP FOLLOW-UP VISIT: CPT

## 2019-07-05 PROCEDURE — 73502 X-RAY EXAM HIP UNI 2-3 VIEWS: CPT

## 2019-07-05 RX ORDER — AMOXICILLIN 500 MG/1
500 CAPSULE ORAL
Qty: 8 | Refills: 2 | Status: ACTIVE | COMMUNITY
Start: 2019-07-05 | End: 1900-01-01

## 2019-07-05 NOTE — HISTORY OF PRESENT ILLNESS
[___ Weeks Post Op] : [unfilled] weeks post op [2] : the patient reports pain that is 2/10 in severity [Chills] : no chills [Clean/Dry/Intact] : clean, dry and intact [Fever] : no fever [Erythema] : not erythematous [Swelling] : not swollen [Discharge] : absent of discharge [Dehiscence] : not dehisced [Neuro Intact] : an unremarkable neurological exam [Negative Derrick's] : maneuvers demonstrated a negative Derrick's sign [Vascular Intact] : ~T peripheral vascular exam normal [Hardware in Good Position] : hardware in good position [Xray (Date:___)] : [unfilled] Xray -  [No Obvious Fractures] : no obvious fractures [Good Overall Alignment] : good overall alignment [Excellent Pain Control] : has excellent pain control [Doing Well] : is doing well [No Sign of Infection] : is showing no signs of infection [de-identified] : Patient is a 68 year old male s/p right hip replacement performed at [facility] on 6/19/2019. Patient presents today for his first post operative visit and Dermabond tape removal.  [de-identified] : Dermabond tape was removed from the right hip incision and replaced with Steri-strips. The patient tolerated this well. Incisional care was reviewed with the patient and his spouse. The patient was advised of the nature of the healing process. The patient was advised of the importance of adhering to the DVT prophylaxis protocol utilizing enteric coated aspirin BID until 6 weeks post operative. Patient was advised of the importance of continuing to take Meloxicam as ordered for the prevention of heterotrophic bone ossification for a total of 21 days post operative. The patient was advised to continue with physical therapy and home exercises as recommended. Anterior hip precautions were reviewed with the patent and was encouraged to continue to maintain the precautions until 6 weeks post operative. Signs and symptoms of infection to report were reviewed. Prescription was sent to the pharmacy for antibiotic Amoxicillin for dental prophylaxis as per Dr. Saenz's protocol. Patient will make an appointment to follow up with Dr. Saenz in six weeks. Patient verbalized understanding of all instructions and all of his questions were addressed to his satisfaction. The patient was advised to call the office at anytime with any further questions or concerns.  [de-identified] : The patient was discharged home postoperatively with home physical therapy 3-4 times a week and home exercises. The patient reports pain of 1-2/10 that is mainly discomfort. He is taking tylenol for pain relief, applying ice and elevating the lower extremity. Patient is using Meloxicam 15 mg once daily for the prevention of heterotrophic bone ossification and enteric coated aspirin 81 mg twice daily for DVT prophylaxis.  [de-identified] : The patient has stable antalgic gait using a cane. The right hip is with dermabond tape intact. The incision site is without redness, drainage, or warmth. The right hip flexes to 90 degrees with minimal discomfort. The external and internal rotation of the right hip is with minimal discomfort and stiffness. Leg length appears equal. There is no evidence of infection or cellulitis.

## 2019-07-18 NOTE — PHYSICAL THERAPY INITIAL EVALUATION ADULT - GAIT DEVIATIONS NOTED, PT EVAL
Infusion Nursing Note:  Arlyn Stevens Cookie Braden RN                         decreased weight-shifting ability/decreased chantale

## 2019-08-06 ENCOUNTER — APPOINTMENT (OUTPATIENT)
Dept: ORTHOPEDIC SURGERY | Facility: CLINIC | Age: 69
End: 2019-08-06

## 2019-08-20 ENCOUNTER — APPOINTMENT (OUTPATIENT)
Dept: ORTHOPEDIC SURGERY | Facility: CLINIC | Age: 69
End: 2019-08-20
Payer: MEDICARE

## 2019-08-20 VITALS
DIASTOLIC BLOOD PRESSURE: 77 MMHG | HEART RATE: 65 BPM | WEIGHT: 242 LBS | HEIGHT: 69 IN | BODY MASS INDEX: 35.84 KG/M2 | SYSTOLIC BLOOD PRESSURE: 145 MMHG

## 2019-08-20 PROCEDURE — 73502 X-RAY EXAM HIP UNI 2-3 VIEWS: CPT | Mod: RT

## 2019-08-20 PROCEDURE — 99024 POSTOP FOLLOW-UP VISIT: CPT

## 2019-12-24 ENCOUNTER — TRANSCRIPTION ENCOUNTER (OUTPATIENT)
Age: 69
End: 2019-12-24

## 2020-01-04 ENCOUNTER — TRANSCRIPTION ENCOUNTER (OUTPATIENT)
Age: 70
End: 2020-01-04

## 2020-03-30 NOTE — H&P PST ADULT - RESPIRATORY AND THORAX
Approved, auth #[id number], from 9/27/19-12/27/19, for cpt [id number], [id number], [id number].      Jackie ROBERTSON RN, BSN  Interventional Radiology Nurse Coordinator   Phone: [phone]     Outpatient Medications Marked as Taking for the 3/29/20 encounter (Refill) with Sidney Jones, DO   Medication Sig Dispense Refill   • methylpheniDATE (METADATE ER) 20 MG ER tablet Take 1 tablet by mouth every morning. 30 tablet 0     Last refill 2/14/2020  Last Visit: 10/2/19   Next Visit: Visit date not found    Labs:     Ok to refill?   negative

## 2020-08-03 ENCOUNTER — APPOINTMENT (OUTPATIENT)
Dept: ORTHOPEDIC SURGERY | Facility: CLINIC | Age: 70
End: 2020-08-03
Payer: MEDICARE

## 2020-08-03 VITALS
BODY MASS INDEX: 35.84 KG/M2 | TEMPERATURE: 98.3 F | DIASTOLIC BLOOD PRESSURE: 68 MMHG | SYSTOLIC BLOOD PRESSURE: 129 MMHG | HEIGHT: 69 IN | HEART RATE: 51 BPM | WEIGHT: 242 LBS

## 2020-08-03 PROCEDURE — 99214 OFFICE O/P EST MOD 30 MIN: CPT | Mod: 25

## 2020-08-03 PROCEDURE — 73562 X-RAY EXAM OF KNEE 3: CPT | Mod: LT

## 2020-08-03 PROCEDURE — 20610 DRAIN/INJ JOINT/BURSA W/O US: CPT | Mod: LT

## 2020-08-03 PROCEDURE — 73502 X-RAY EXAM HIP UNI 2-3 VIEWS: CPT | Mod: RT

## 2020-08-03 RX ORDER — HYALURONATE SOD, CROSS-LINKED 30 MG/3 ML
30 SYRINGE (ML) INTRAARTICULAR
Refills: 0 | Status: COMPLETED | OUTPATIENT
Start: 2020-08-03

## 2020-08-03 RX ORDER — LIDOCAINE HYDROCHLORIDE 10 MG/ML
1 INJECTION, SOLUTION INFILTRATION; PERINEURAL
Refills: 0 | Status: COMPLETED | OUTPATIENT
Start: 2020-08-03

## 2020-08-03 RX ADMIN — Medication %: at 00:00

## 2020-08-03 RX ADMIN — Medication 0 MG/3ML: at 00:00

## 2021-01-29 NOTE — H&P PST ADULT - GASTROINTESTINAL
Spoke to patients wife  Itching and red patches have started again  While on prednisone 5mg per day  Recent CBC at 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

## 2021-09-27 ENCOUNTER — NON-APPOINTMENT (OUTPATIENT)
Age: 71
End: 2021-09-27

## 2021-09-27 ENCOUNTER — APPOINTMENT (OUTPATIENT)
Dept: ORTHOPEDIC SURGERY | Facility: CLINIC | Age: 71
End: 2021-09-27
Payer: MEDICARE

## 2021-09-27 VITALS
WEIGHT: 235 LBS | BODY MASS INDEX: 34.8 KG/M2 | HEART RATE: 68 BPM | SYSTOLIC BLOOD PRESSURE: 127 MMHG | DIASTOLIC BLOOD PRESSURE: 88 MMHG | HEIGHT: 69 IN

## 2021-09-27 DIAGNOSIS — M17.12 UNILATERAL PRIMARY OSTEOARTHRITIS, LEFT KNEE: ICD-10-CM

## 2021-09-27 DIAGNOSIS — Z96.641 PRESENCE OF RIGHT ARTIFICIAL HIP JOINT: ICD-10-CM

## 2021-09-27 PROCEDURE — 73502 X-RAY EXAM HIP UNI 2-3 VIEWS: CPT | Mod: RT

## 2021-09-27 PROCEDURE — 20610 DRAIN/INJ JOINT/BURSA W/O US: CPT | Mod: LT

## 2021-09-27 PROCEDURE — 99214 OFFICE O/P EST MOD 30 MIN: CPT | Mod: 25

## 2021-09-27 PROCEDURE — 73562 X-RAY EXAM OF KNEE 3: CPT | Mod: LT

## 2021-09-27 RX ORDER — HYALURONATE SOD, CROSS-LINKED 30 MG/3 ML
30 SYRINGE (ML) INTRAARTICULAR
Refills: 0 | Status: COMPLETED | OUTPATIENT
Start: 2021-09-27

## 2021-09-27 RX ORDER — LIDOCAINE HYDROCHLORIDE 10 MG/ML
1 INJECTION, SOLUTION INFILTRATION; PERINEURAL
Refills: 0 | Status: COMPLETED | OUTPATIENT
Start: 2021-09-27

## 2021-09-27 RX ADMIN — Medication %: at 00:00

## 2021-09-27 RX ADMIN — Medication 0 MG/3ML: at 00:00

## 2021-10-07 ENCOUNTER — TRANSCRIPTION ENCOUNTER (OUTPATIENT)
Age: 71
End: 2021-10-07

## 2021-10-16 ENCOUNTER — TRANSCRIPTION ENCOUNTER (OUTPATIENT)
Age: 71
End: 2021-10-16

## 2021-10-30 ENCOUNTER — TRANSCRIPTION ENCOUNTER (OUTPATIENT)
Age: 71
End: 2021-10-30

## 2021-11-09 NOTE — OCCUPATIONAL THERAPY INITIAL EVALUATION ADULT - PERSONAL SAFETY AND JUDGMENT, REHAB EVAL
Instructed to call immediately if any new distortion, blurring, decreased vision or eye pain. intact

## 2022-06-08 NOTE — PATIENT PROFILE ADULT - BRADEN SENSORY
[FreeTextEntry1] : Nasal congestion and PND, may be related to recent URI:\par - Start Flonase BID and saline rinses\par - Can f/u with Dr. Tian for allergy evaluation. \par - F/U PRN\par \par \par I personally saw and examined Mr. BENITO HUSSEIN in detail this visit today. I personally reviewed the HPI, PMH, FH, SH, ROS and medications/allergies. I have spoken to ASHLYN Bobo regarding the history and have personally determined the assessment and plan of care, and documented this myself. I was present and participated in all key portions of the encounter and all procedures noted above. I have made changes in the body of the note where appropriate.\par \par Attesting Faculty: See Attending Signature Below  (4) no impairment

## 2023-06-08 NOTE — PRE-OP CHECKLIST - BOWEL PREP
Application Tool (Optional): Liquid Nitrogen Sprayer Render Note In Bullet Format When Appropriate: No Show Applicator Variable?: Yes Detail Level: Detailed Number Of Freeze-Thaw Cycles: 1 freeze-thaw cycle Post-Care Instructions: I reviewed with the patient in detail post-care instructions. Patient is to wear sunprotection, and avoid picking at any of the treated lesions. Pt may apply Vaseline to crusted or scabbing areas. Consent: The patient's consent was obtained including but not limited to risks of crusting, scabbing, blistering, scarring, darker or lighter pigmentary change, recurrence, incomplete removal and infection. Duration Of Freeze Thaw-Cycle (Seconds): 3 n/a

## 2023-06-30 ENCOUNTER — APPOINTMENT (OUTPATIENT)
Dept: NEUROLOGY | Facility: CLINIC | Age: 73
End: 2023-06-30
Payer: MEDICARE

## 2023-06-30 VITALS
HEART RATE: 49 BPM | HEIGHT: 69 IN | DIASTOLIC BLOOD PRESSURE: 72 MMHG | WEIGHT: 235 LBS | SYSTOLIC BLOOD PRESSURE: 146 MMHG | BODY MASS INDEX: 34.8 KG/M2

## 2023-06-30 PROCEDURE — 99204 OFFICE O/P NEW MOD 45 MIN: CPT

## 2023-06-30 NOTE — ASSESSMENT
[FreeTextEntry1] : Mr. Chery is a 72-year-old who presents with a 2-year history of semantic difficulties particularly name recall.  The cause of his symptoms is uncertain.  Normal aging cannot be excluded.  Structural processes including ischemic disease should be excluded.  An early neurodegenerative disorder is a possibility.\par \par I had a long discussion with Mr. Chery regarding aging brain symptoms.  I suggested that he undergo an MRI of the brain with neuro quant and a comprehensive serologic evaluation.  If unrevealing, I discussed the possibility of obtaining an FDG PET scan to further define the cause of his symptoms.  Further management will depend upon these results and his clinical course.

## 2023-06-30 NOTE — PHYSICAL EXAM
[FreeTextEntry1] : Constitutional:  Patient was well-developed, well-nourished and in no acute distress. \par \par Head:  Normocephalic, atraumatic. Tympanic membranes were clear. \par \par Neck:  Supple with full range of motion. \par \par Cardiovascular:  Cardiac rhythm was regular without murmur. There were no carotid bruits. Peripheral pulses were full and symmetric. \par \par Respiratory:  Lungs were clear. \par \par Abdomen:  Soft and nontender. \par \par Spine:  Nontender. \par \par Skin:  There were no rashes. \par \par NEUROLOGICAL EXAMINATION:\par \par Mental Status: Patient was alert and oriented. Speech was fluent. There was no dysarthria or dysnomia.  Repetition was normal.  He scored 29 out of 30 on MMSE making 1 error in recall.\par \par Cranial Nerves: \par \par II: He could finger count bilaterally. Pupils were equal and reactive. Visual fields were full. Funduscopic examination was normal. \par \par III, IV, VI:  Eye movements were full without nystagmus. \par \par V: Facial sensation was intact. \par \par VII: Facial strength was normal. \par \par VIII: Hearing was mildly diminished in the right ear compared to the left.\par \par IX, X: Palatal movement was normal. Phonation was normal. \par \par XI: Sternocleidomastoids and trapezii were normal. \par \par XII: Tongue was midline and movements normal. There was no lingual atrophy or fasciculations. \par \par Motor Examination: Muscle bulk, tone and strength were normal. \par \par Sensory Examination: Pinprick, vibration and joint position sense were intact. \par \par Reflexes: DTRs were 1 at the biceps and absent elsewhere.\par \par Plantar Responses: Plantar responses were flexor. \par \par Coordination/Cerebellar Function: There was no dysmetria on finger to nose or heel to shin testing. \par \par Gait/Stance: Gait was normal. Romberg was negative.  Tandem was mildly unsteady.\par

## 2023-06-30 NOTE — CONSULT LETTER
[Dear  ___] : Dear  [unfilled], [Consult Letter:] : I had the pleasure of evaluating your patient, [unfilled]. [Please see my note below.] : Please see my note below. [Consult Closing:] : Thank you very much for allowing me to participate in the care of this patient.  If you have any questions, please do not hesitate to contact me. [Sincerely,] : Sincerely, [FreeTextEntry3] : Vince Herrera MD\par  [DrcMkay  ___] : Dr. ARRINGTON

## 2023-06-30 NOTE — HISTORY OF PRESENT ILLNESS
[FreeTextEntry1] : Mr. Kaden Chery is a 72-year-old right-handed patient who was referred for neurologic evaluation at your kind suggestion.  He was accompanied by his wife Marga.\par \par Mr. Chery is a retired .  Approximately 3 years ago, he noticed difficulty with language.  He often had difficulty recalling the proper word.  He sometimes calls his grandchildren by the wrong name.  He denies mispronunciation or difficulties with comprehension or memory.  There has been no change in personality.  He is under the care of Dr. Yan Calderon, a psychiatrist.  He is being treated with buspirone.  There is no family history of dementia.  He suffers from snoring but denies REM behavioral symptoms.  He denies headaches, visual, swallowing, motor, sensory, gait or sphincteric difficulties.  He is mildly hard of hearing\par \par Past surgical history is notable for a left knee reconstruction and right total knee and hip replacements.  He suffers from hypertension and hyperlipidemia.  There is no history of diabetes, cardiac, pulmonary, renal, hepatic, gastrointestinal, thyroid, hematologic or cerebrovascular disease.  He has no allergies.  Medications include meloxicam, benazepril, metoprolol, hydrochlorothiazide, rosuvastatin, pantoprazole, amlodipine and buspirone.  He is  and is a retired .  He is a non-smoker and nondrinker.  Family history is notable for heart disease in his parents.

## 2023-07-24 NOTE — H&P PST ADULT - ALLERGIC/IMMUNOLOGIC
[address] [facility]  Progress Note  Name: Adeel Elmore  MRN: [id number]  Unit/Bed#: PPHP 160-06 I Date of Admission: 7/23/2023   Date of Service: 7/24/2023 I Hospital Day: 1    Assessment/Plan   * Acute on chronic diastolic heart failure [facility])  Assessment & Plan  Wt Readings from Last 3 Encounters:   07/18/23 (!) 158 kg (349 lb)   05/01/23 (!) 149 kg (327 lb 6.4 oz)   04/18/23 (!) 147 kg (323 lb 13.7 oz)     · Patient with diastolic heart failure, presented to ER with ongoing dyspnea and weight gain. · dry weight is 332 pounds. Patient reported he weighed 341 pounds 7/22 in am   · Mildly elevated proBNP, chest x-ray consistent with vascular congestion. · Patient's home medication is Bumex 4 mg twice daily with metolazone, patient is compliant. · Continue on IV Lasix 100 mg twice daily  · Continue home medication Jardiance for now monitor Cr. · Intake, output, daily weight  · Heart failure consult recommending IV bumex gtt and IV lasix discontinued  · Kdur ordered 40 meq twice daily. Stage 3a chronic kidney disease [facility])  Assessment & Plan  Lab Results   Component Value Date    EGFR 50 07/24/2023    EGFR 55 07/23/2023    EGFR 37 06/19/2023    CREATININE 1.52 (H) 07/24/2023    CREATININE 1.40 (H) 07/23/2023    CREATININE 1.94 (H) 06/19/2023     · Baseline appears to be 1.5-1.7; patient currently at baseline  · Monitor renal function while on IV diuresis  · Monitor for electrolytes, replete potassium as needed    Severe persistent asthma without complication  Assessment & Plan  · follows with Dr. Estela Giordano as outpatient.   · No evidence of exacerbation  · Continue inhalers while inpatient     Paroxysmal atrial fibrillation (HCC)  Assessment & Plan  · Rate controlled on metoprolol, anticoagulated with Eliquis  · Heart rate regular     Type 2 diabetes mellitus, without long-term current use of insulin [facility])  Assessment & Plan  Start sliding scale    Recent Labs     07/23/23  3729 23  0517   POCGLU 165* 160*       Blood Sugar Average: Last 72 hrs:  (P) 162.5   · Hold home medication Januvia  · Jardiance continued  · Continue SSI and blood glucose monitoring     Primary hypertension  Assessment & Plan  · BP reasonable continue to monitor blood pressure while on IV Lasix  · Continue home medication metoprolol  · Routine vitals per unit              VTE Pharmacologic Prophylaxis:   on eliquis    Patient Centered Rounds: I performed bedside rounds with nursing staff today. Discussions with Specialists or Other Care Team Provider: heart failure note reviewed     Education and Discussions with Family / Patient: Patient declined call to . Total Time Spent on Date of Encounter in care of patient: 35 minutes This time was spent on one or more of the following: performing physical exam; counseling and coordination of care; obtaining or reviewing history; documenting in the medical record; reviewing/ordering tests, medications or procedures; communicating with other healthcare professionals and discussing with patient's family/caregivers. Current Length of Stay: 1 day(s)  Current Patient Status: Inpatient   Certification Statement: The patient will continue to require additional inpatient hospital stay due to acute on chronic CHF with need for IV diuretics   Discharge Plan: Anticipate discharge in 48 hrs to home. Code Status: Level 1 - Full Code    Subjective:   Patient reports feeling better than when he came in especially with SOB. Patient has reports continuing with fluid in his stomach and legs. Patient ask if someone could call the power company as he is going to have his power shut off on Thursday. Message given to  with number for provider to sign.      Objective:     Vitals:   Temp (24hrs), Av.4 °F (36.9 °C), Min:98.2 °F (36.8 °C), Max:98.5 °F (36.9 °C)    Temp:  [98.2 °F (36.8 °C)-98.5 °F (36.9 °C)] 98.5 °F (36.9 °C)  HR:  [71-94] 80  Resp:  [16-22] 19  BP: (121-152)/(56-89) 123/59  SpO2:  [94 %-99 %] 97 %  Body mass index is 46.19 kg/m². Input and Output Summary (last 24 hours): Intake/Output Summary (Last 24 hours) at 7/24/2023 1705  Last data filed at 7/24/2023 1626  Gross per 24 hour   Intake 1190 ml   Output 3075 ml   Net -1885 ml       Physical Exam:   Physical Exam  Vitals and nursing note reviewed. Constitutional:       General: He is not in acute distress. Appearance: He is well-developed. He is obese. HENT:      Head: Normocephalic and atraumatic. Eyes:      Conjunctiva/sclera: Conjunctivae normal.   Cardiovascular:      Rate and Rhythm: Normal rate and regular rhythm. Heart sounds: No murmur heard. Pulmonary:      Effort: Pulmonary effort is normal. No respiratory distress. Breath sounds: Normal breath sounds. Abdominal:      Palpations: Abdomen is soft. Tenderness: There is no abdominal tenderness. Musculoskeletal:         General: No swelling. Cervical back: Neck supple. Right lower leg: Edema present. Left lower leg: Edema present. Skin:     General: Skin is warm and dry. Capillary Refill: Capillary refill takes less than 2 seconds. Neurological:      Mental Status: He is alert and oriented to person, place, and time.    Psychiatric:         Mood and Affect: Mood normal.          Additional Data:     Labs:  Results from last 7 days   Lab Units 07/24/23  0522 07/23/23  1430   WBC Thousand/uL 7.03 6.29   HEMOGLOBIN g/dL 8.2* 10.3*   HEMATOCRIT % 27.3* 34.1*   PLATELETS Thousands/uL 192 248   NEUTROS PCT %  --  77*   LYMPHS PCT %  --  13*   MONOS PCT %  --  9   EOS PCT %  --  0     Results from last 7 days   Lab Units 07/24/23  0522 07/23/23  1430   SODIUM mmol/L 138 138   POTASSIUM mmol/L 3.7 4.2   CHLORIDE mmol/L 104 107   CO2 mmol/L 26 24   BUN mg/dL 21 18   CREATININE mg/dL 1.52* 1.40*   ANION GAP mmol/L 8 7   CALCIUM mg/dL 7.7* 8.2*   ALBUMIN g/dL  --  3.2*   TOTAL BILIRUBIN mg/dL  -- 0. 37   ALK PHOS U/L  --  107   ALT U/L  --  32   AST U/L  --  45   GLUCOSE RANDOM mg/dL 160* 216*         Results from last 7 days   Lab Units 07/24/23  1613 07/24/23  1037 07/24/23  0517 07/23/23  2155   POC GLUCOSE mg/dl 181* 214* 160* 165*               Lines/Drains:  Invasive Devices     Peripheral Intravenous Line  Duration           Peripheral IV 07/24/23 Proximal;Right;Ventral (anterior) Forearm <1 day                  Telemetry:  Telemetry Orders (From admission, onward)             24 Hour Telemetry Monitoring  Continuous x 24 Hours (Telem)        Expiring   Question:  Reason for 24 Hour Telemetry  Answer:  Decompensated CHF- and any one of the following: continuous diuretic infusion or total diuretic dose >200 mg daily, associated electrolyte derangement (I.e. K < 3.0), ionotropic drip (continuous infusion), hx of ventricular arrhythmia, or new EF < 35%                 Telemetry Reviewed: Normal Sinus Rhythm  Indication for Continued Telemetry Use: Acute CHF on >200 mg lasix/day or equivalent dose or with new reduced EF.               Imaging: Reviewed radiology reports from this admission including: chest xray    Recent Cultures (last 7 days):         Last 24 Hours Medication List:   Current Facility-Administered Medications   Medication Dose Route Frequency Provider Last Rate   • acetaminophen  650 mg Oral Q6H PRN Brad Cardoza MD     • albuterol  2 puff Inhalation Q4H PRN Brad Cardoza MD     • albuterol  2.5 mg Nebulization Q4H PRN Brad Cardoza MD     • apixaban  5 mg Oral BID Brad Cardoza MD     • atorvastatin  10 mg Oral Daily Brad Cardoza MD     • budesonide-formoterol  2 puff Inhalation BID Brad Cardoza MD     • bumetanide (BUMEX) 12.5 mg infusion 50 mL  1 mg/hr Intravenous Continuous Jaguar Lockhart PA-C 1 mg/hr (07/24/23 1351)   • Empagliflozin  10 mg Oral Daily Brad Cardoza MD     • insulin lispro  1-5 Units Subcutaneous HS Brad Cardoza MD     • insulin lispro  1-6 Units Subcutaneous TID AC Veronica Vega MD     • metoprolol succinate  50 mg Oral Daily Veronica Vega MD     • montelukast  10 mg Oral HS Veronica Vega MD     • ondansetron  4 mg Intravenous Q6H PRN Veronica Vega MD     • pantoprazole  40 mg Oral Daily Veronica Vega MD     • potassium chloride  40 mEq Oral BID Elsie Corbett PA-C     • pregabalin  50 mg Oral HS Veronica Vega MD     • umeclidinium  1 puff Inhalation Daily Veronica Vega MD          Today, Patient Was Seen By: RODRIGUE Adame    **Please Note: This note may have been constructed using a voice recognition system. ** negative

## 2023-07-25 ENCOUNTER — LABORATORY RESULT (OUTPATIENT)
Age: 73
End: 2023-07-25

## 2023-07-25 ENCOUNTER — NON-APPOINTMENT (OUTPATIENT)
Age: 73
End: 2023-07-25

## 2023-07-25 LAB
25(OH)D3 SERPL-MCNC: 19.5 NG/ML
ALBUMIN SERPL ELPH-MCNC: 4.5 G/DL
ALP BLD-CCNC: 49 U/L
ALT SERPL-CCNC: 15 U/L
ANION GAP SERPL CALC-SCNC: 13 MMOL/L
AST SERPL-CCNC: 16 U/L
BILIRUB SERPL-MCNC: 0.6 MG/DL
BUN SERPL-MCNC: 16 MG/DL
CALCIUM SERPL-MCNC: 9.9 MG/DL
CHLORIDE SERPL-SCNC: 101 MMOL/L
CHOLEST SERPL-MCNC: 176 MG/DL
CO2 SERPL-SCNC: 24 MMOL/L
CREAT SERPL-MCNC: 0.69 MG/DL
CRP SERPL-MCNC: <3 MG/L
EGFR: 98 ML/MIN/1.73M2
ERYTHROCYTE [SEDIMENTATION RATE] IN BLOOD BY WESTERGREN METHOD: 27 MM/HR
FOLATE SERPL-MCNC: 11.5 NG/ML
GLUCOSE SERPL-MCNC: 107 MG/DL
HCYS SERPL-MCNC: 13.2 UMOL/L
HDLC SERPL-MCNC: 40 MG/DL
LDLC SERPL CALC-MCNC: 109 MG/DL
NONHDLC SERPL-MCNC: 135 MG/DL
POTASSIUM SERPL-SCNC: 4.2 MMOL/L
PROT SERPL-MCNC: 7.1 G/DL
SODIUM SERPL-SCNC: 138 MMOL/L
T PALLIDUM AB SER QL IA: NEGATIVE
TRIGL SERPL-MCNC: 148 MG/DL
TSH SERPL-ACNC: 2.61 UIU/ML
VIT B12 SERPL-MCNC: 392 PG/ML

## 2023-07-26 LAB
ALBUMIN MFR SERPL ELPH: 60.3 %
ALBUMIN SERPL-MCNC: 4.3 G/DL
ALBUMIN/GLOB SERPL: 1.5 RATIO
ALPHA1 GLOB MFR SERPL ELPH: 4.3 %
ALPHA1 GLOB SERPL ELPH-MCNC: 0.3 G/DL
ALPHA2 GLOB MFR SERPL ELPH: 12 %
ALPHA2 GLOB SERPL ELPH-MCNC: 0.9 G/DL
ANACR T: NEGATIVE
B-GLOBULIN MFR SERPL ELPH: 11.2 %
B-GLOBULIN SERPL ELPH-MCNC: 0.8 G/DL
DEPRECATED KAPPA LC FREE/LAMBDA SER: 1.26 RATIO
GAMMA GLOB FLD ELPH-MCNC: 0.9 G/DL
GAMMA GLOB MFR SERPL ELPH: 12.2 %
IGA SER QL IEP: 251 MG/DL
IGG SER QL IEP: 838 MG/DL
IGM SER QL IEP: 40 MG/DL
INTERPRETATION SERPL IEP-IMP: NORMAL
KAPPA LC CSF-MCNC: 1.41 MG/DL
KAPPA LC SERPL-MCNC: 1.77 MG/DL
M PROTEIN SPEC IFE-MCNC: NORMAL
PROT SERPL-MCNC: 7.1 G/DL
PROT SERPL-MCNC: 7.1 G/DL

## 2023-07-27 LAB — IGA 24H UR QL IFE: NORMAL

## 2023-07-28 LAB — METHYLMALONATE SERPL-SCNC: 170 NMOL/L

## 2023-07-31 LAB
AMPA-R ABCBA: NEGATIVE
AMPHIPHYSIN IGG TITR SER IF: NEGATIVE
ANNOTATION COMMENT IMP: NORMAL
CASPR2-IGG CBA, S: NEGATIVE
CV2 IGG TITR SER: NEGATIVE
GABA-B ABCBA: NEGATIVE
GAD65 AB SER-MCNC: 0 NMOL/L
GLIAL NUC TYPE 1 AB TITR SER: NEGATIVE
HU1 AB TITR SER: NEGATIVE
HU2 AB TITR SER IF: NEGATIVE
HU3 AB TITR SER: NEGATIVE
IGLON5 IFA, S: NEGATIVE
IMMUNOLOGIST REVIEW: NORMAL
LGI1-IGG CBA, S: NEGATIVE
NIF IFA, S: NEGATIVE
NMDA-R ABCBA: NEGATIVE
PCA-1 AB TITR SER: NEGATIVE
PCA-2 AB TITR SER: NEGATIVE
PCA-TR AB TITR SER: NEGATIVE
VIT B1 SERPL-MCNC: 148.6 NMOL/L

## 2023-08-03 ENCOUNTER — OUTPATIENT (OUTPATIENT)
Dept: OUTPATIENT SERVICES | Facility: HOSPITAL | Age: 73
LOS: 1 days | End: 2023-08-03
Payer: MEDICARE

## 2023-08-03 ENCOUNTER — APPOINTMENT (OUTPATIENT)
Dept: MRI IMAGING | Facility: CLINIC | Age: 73
End: 2023-08-03
Payer: MEDICARE

## 2023-08-03 DIAGNOSIS — Z00.8 ENCOUNTER FOR OTHER GENERAL EXAMINATION: ICD-10-CM

## 2023-08-03 DIAGNOSIS — Z96.651 PRESENCE OF RIGHT ARTIFICIAL KNEE JOINT: Chronic | ICD-10-CM

## 2023-08-03 DIAGNOSIS — Z98.890 OTHER SPECIFIED POSTPROCEDURAL STATES: Chronic | ICD-10-CM

## 2023-08-03 PROCEDURE — 76377 3D RENDER W/INTRP POSTPROCES: CPT

## 2023-08-03 PROCEDURE — 70553 MRI BRAIN STEM W/O & W/DYE: CPT | Mod: 26,MH

## 2023-08-03 PROCEDURE — A9585: CPT

## 2023-08-03 PROCEDURE — 76377 3D RENDER W/INTRP POSTPROCES: CPT | Mod: 26

## 2023-08-03 PROCEDURE — 70553 MRI BRAIN STEM W/O & W/DYE: CPT

## 2023-08-06 ENCOUNTER — NON-APPOINTMENT (OUTPATIENT)
Age: 73
End: 2023-08-06

## 2023-08-08 ENCOUNTER — NON-APPOINTMENT (OUTPATIENT)
Age: 73
End: 2023-08-08

## 2023-08-14 ENCOUNTER — NON-APPOINTMENT (OUTPATIENT)
Age: 73
End: 2023-08-14

## 2023-08-15 ENCOUNTER — APPOINTMENT (OUTPATIENT)
Dept: ORTHOPEDIC SURGERY | Facility: CLINIC | Age: 73
End: 2023-08-15
Payer: MEDICARE

## 2023-08-15 VITALS
BODY MASS INDEX: 34.07 KG/M2 | WEIGHT: 230 LBS | HEIGHT: 69 IN | SYSTOLIC BLOOD PRESSURE: 162 MMHG | DIASTOLIC BLOOD PRESSURE: 79 MMHG | HEART RATE: 58 BPM

## 2023-08-15 DIAGNOSIS — Z78.9 OTHER SPECIFIED HEALTH STATUS: ICD-10-CM

## 2023-08-15 PROCEDURE — 20550 NJX 1 TENDON SHEATH/LIGAMENT: CPT | Mod: RT

## 2023-08-15 PROCEDURE — 99214 OFFICE O/P EST MOD 30 MIN: CPT | Mod: 25

## 2023-08-15 NOTE — HISTORY OF PRESENT ILLNESS
[FreeTextEntry1] : Patient is 74 yo RHD male  presents as NEW PATIENT for hand evaluation. Patient notes catching in the right ring finger for approximately 1 to 2 months duration. Patient describes triggering and reports pain associated with the trigger phenomenon. Patient has no other triggering currently or previously. Patient not aware of numbness or tingling in fingers.

## 2023-08-15 NOTE — ASSESSMENT
[FreeTextEntry1] : Patient has trigger finger right ring finger approximately 1 to 2 months duration.  Patient notes pain when the finger triggers.  Patient was under the impression that pain was located at PIP joint, however during the course of exam it became evident that the patient that pathology is located at A1 pulley where there is triggering and tenderness. Nearly certainly this is the explanation of the right hand pain. I have reviewed the nature of trigger finger and provided printed educational material.  I discussed pathophysiology, treatment options, risks and complications.  Following the discussion the patient requested and was treated with 2 phase cortisone injection into right ring finger flexor sheath at A1 pulley level without complication. The statistical chances of resolution versus recurrence, and the statistics regarding the possibility of additional injections were discussed with the patient.  Prognosis uncertain Return as needed. The following post-injection instructions were given to the patient: The patient should be cautious in activities for two weeks and then increase to full activities.  Thereafter, the patient should return if symptoms continue, or if they chandra and recur subsequently. If symptoms do not recur, the patient need not return and can be seen on an as needed basis. The patient has expressed understanding and acceptance of analysis, treatment, and recommendations.  All questions answered.

## 2023-08-15 NOTE — PHYSICAL EXAM
[de-identified] : Right wrist  Good motion without localizing findings  right hand Basal joint without crepitus or pain Ring finger A1 pulley tender When patient makes active composite fist Ring finger locks and triggers into extension with associated discomfort. There is no other A1 pulley tenderness or triggering in any other finger, right hand. No pertinent MP, PIP, or DIP joint contributory findings, except some Heberden's nodes; none are clinically painful.  Left wrist Good motion without localizing findings. Left hand Basal joint minimal crepitus no notable pain. No A1 pulley tenderness and no triggering in any finger.  I No pertinent MP, PIP, or DIP joint contributory findings, except some Heberden's nodes; none are clinically painful.  Neurologic: Median, ulnar, and radial motor and sensory are intact.  Skin: No cyanosis, clubbing, or rashes. Vascular: Radial pulses intact. Lymphatic: No streaking or epitrochlear adenopathy. The patient is awake, alert, and oriented. Affect appropriate. Cooperative.

## 2023-12-20 ENCOUNTER — APPOINTMENT (OUTPATIENT)
Dept: ORTHOPEDIC SURGERY | Facility: CLINIC | Age: 73
End: 2023-12-20
Payer: MEDICARE

## 2023-12-20 VITALS
BODY MASS INDEX: 34.07 KG/M2 | DIASTOLIC BLOOD PRESSURE: 83 MMHG | HEIGHT: 69 IN | HEART RATE: 57 BPM | WEIGHT: 230 LBS | SYSTOLIC BLOOD PRESSURE: 151 MMHG

## 2023-12-20 DIAGNOSIS — M65.341 TRIGGER FINGER, RIGHT RING FINGER: ICD-10-CM

## 2023-12-20 DIAGNOSIS — M79.641 PAIN IN RIGHT HAND: ICD-10-CM

## 2023-12-20 PROCEDURE — 99214 OFFICE O/P EST MOD 30 MIN: CPT | Mod: 25

## 2023-12-20 PROCEDURE — 20550 NJX 1 TENDON SHEATH/LIGAMENT: CPT | Mod: RT

## 2023-12-23 NOTE — PROCEDURE
[] : The injection was done in 2 phases with the first phase being subcutaneous injection of lidocaine 1.5 cc subcutaneously as anesthetic. When adequate level of anesthesia was achieved, the Celestone injection was undertaken. [4th] : 4th finger [FreeTextEntry1] : KENALOG 30 mg injected; NO Celestone injected

## 2023-12-23 NOTE — ASSESSMENT
[FreeTextEntry1] : Patient was relieved to triggering following previous injection.  Triggering is recurred over the past 2 to 3 weeks but the finger is not locking only triggering. Pt reports that the pain is perceived more as discomfort than actual acute pain.  I reviewed treatment options risks and complications.  Patient would prefer another attempt with cortisone injection which is a reasonable consideration and treatment plan. Because of relatively rapid recurrence patient treated with Kenalog 30 mg injection instead of Celestone 6 mg. Prognosis uncertain/limited. If triggering continues or recurs patient should return. Otherwise, return as needed A lengthy and detailed discussion was held with the patient regarding analysis, treatment, and recommendations. All questions have been answered. At the conclusion the patient expressed acceptance, understanding and agreement with the plan.

## 2023-12-23 NOTE — HISTORY OF PRESENT ILLNESS
[FreeTextEntry1] : Patient is 74 yo RHD male  seen 8/15/2023 for trigger finger right ring finger.  Patient initially was under the impression that his pain was PIP joint related but it became evident that pathology was at A1 pulley.  The patient was treated with Celestone 6 mg injection.  TODAY: Patient presents as a RETURN PATIENT for hand evaluation. The patient reports that following the cortisone injection the clicking and triggering stopped completely. Patient reports he noted recurrence of clicking in the finger approximately 2 weeks ago. Patient not aware of finger locking but is aware of discomfort and unpleasant feeling associated with a clicking. Patient not aware of any other trigger fingers. Patient denies numbness, tingling. Patient has no other hand complaints.

## 2023-12-23 NOTE — PHYSICAL EXAM
[de-identified] : Right wrist  Good motion without localizing findings  right hand Basal joint without crepitus or pain Ring finger A1 pulley tender There is subtle triggering/clicking but no locking of ring finger during composite fist and flexion/extension. There is no other A1 pulley tenderness or triggering in any other finger, right hand. No pertinent MP, PIP, or DIP joint contributory findings, except some Heberden's nodes; none are clinically painful.  Left wrist Good motion without localizing findings. Left hand Basal joint minimal crepitus no notable pain. No A1 pulley tenderness and no triggering in any finger.  I No pertinent MP, PIP, or DIP joint contributory findings, except some Heberden's nodes; none are clinically painful.  Neurologic: Median, ulnar, and radial motor and sensory are intact.  Skin: No cyanosis, clubbing, or rashes. Vascular: Radial pulses intact. Lymphatic: No streaking or epitrochlear adenopathy. The patient is awake, alert, and oriented. Affect appropriate. Cooperative.

## 2024-01-02 ENCOUNTER — APPOINTMENT (OUTPATIENT)
Dept: NEUROLOGY | Facility: CLINIC | Age: 74
End: 2024-01-02
Payer: MEDICARE

## 2024-01-02 VITALS
WEIGHT: 230 LBS | DIASTOLIC BLOOD PRESSURE: 68 MMHG | BODY MASS INDEX: 34.07 KG/M2 | SYSTOLIC BLOOD PRESSURE: 136 MMHG | HEART RATE: 45 BPM | HEIGHT: 69 IN

## 2024-01-02 DIAGNOSIS — R47.01 APHASIA: ICD-10-CM

## 2024-01-02 PROCEDURE — 99214 OFFICE O/P EST MOD 30 MIN: CPT

## 2024-01-02 NOTE — ASSESSMENT
[FreeTextEntry1] : Mr. Chery is a 73-year-old with history of mood disturbance who presented with longstanding difficulties with word recall.  His initial testing was unrevealing.  His complaints might be due to a combination of normal aging and mood disturbance.  I cannot exclude an early neurodegenerative disorder.  I again suggested that he speak to Dr. Calderon about the possibility of more advanced testing possibly neuropsychological testing, FDG PET scan or CSF analysis.  If desired, empiric treatment with a cholinesterase inhibitor can also be considered.  I suggested a routine follow-up visit in 6/4/2024.  Telephone contact will be maintained in the meantime.

## 2024-01-02 NOTE — HISTORY OF PRESENT ILLNESS
[FreeTextEntry1] : Mr. Kaden Chery returned to the office having been initially seen on June 30, 2023.  He is a 73-year-old right-handed patient who was a retired .  Approximately 3 years before, he noticed difficulty with language.  He often had difficulty recalling the proper word.  He sometimes called his grandchildren by the wrong name.  He denied mispronunciation or difficulties with comprehension or memory.  There had been no change in personality.  He was under the care of Dr. Yan Calderon, a psychiatrist.  He was being treated with buspirone.  There was no family history of dementia.  He suffered from snoring but denied REM behavioral symptoms.  He denied headaches, visual, swallowing, motor, sensory, gait or sphincteric difficulties.  He was mildly hard of hearing.  He scored 29 out of 30 on MMSE making 1 error in recall.  A comprehensive serologic evaluation was unremarkable.  MRI of the brain revealed moderate white matter changes.  Since last seen, buspirone was discontinued by Dr. Calderon.  Mr. Chery admits to mild depression.  Mrs. Chery feels that he is not as sharp as he was before.  He notes that he sometimes uses the incorrect word.  He however remains totally independent in all activities of daily living.  He was told by Dr. Calderon that this might represent normal aging.  Past surgical history is notable for a left knee reconstruction and right total knee and hip replacements.  He suffers from hypertension and hyperlipidemia.  There is no history of diabetes, cardiac, pulmonary, renal, hepatic, gastrointestinal, thyroid, hematologic or cerebrovascular disease.  He has no allergies.  Medications include meloxicam, benazepril, metoprolol, hydrochlorothiazide, rosuvastatin, pantoprazole and amlodipine.  He is  and is a retired .  He is a non-smoker and nondrinker.  Family history is notable for heart disease in his parents.

## 2024-01-02 NOTE — CONSULT LETTER
[Dear  ___] : Dear  [unfilled], [Consult Letter:] : I had the pleasure of evaluating your patient, [unfilled]. [Please see my note below.] : Please see my note below. [Consult Closing:] : Thank you very much for allowing me to participate in the care of this patient.  If you have any questions, please do not hesitate to contact me. [Sincerely,] : Sincerely, [FreeTextEntry3] : Vince Herrera MD\par   [DrMckay  ___] : Dr. ARRINGTON

## 2024-01-02 NOTE — PHYSICAL EXAM
[FreeTextEntry1] : Constitutional:  Patient was well-developed, well-nourished and in no acute distress.   Head:  Normocephalic, atraumatic. Tympanic membranes were not examined.   Neck:  Supple with full range of motion.   Cardiovascular:  Cardiac rhythm was regular without murmur. There were no carotid bruits. Peripheral pulses were full and symmetric.   Respiratory:  Lungs were clear.   Abdomen:  Soft and nontender.   Spine:  Nontender.   Skin:  There were no rashes.   NEUROLOGICAL EXAMINATION:  Mental Status: Patient was alert and oriented. Speech was fluent. There was no dysarthria or dysnomia.  Repetition was normal.  He scored a perfect 30 on MMSE.  Cranial Nerves:   II: He could finger count bilaterally. Pupils were equal and reactive. Visual fields were full. Funduscopic examination was normal.   III, IV, VI:  Eye movements were full without nystagmus.   V: Facial sensation was intact.   VII: Facial strength was normal.   VIII: Hearing was mildly diminished in the right ear compared to the left.  IX, X: Palatal movement was normal. Phonation was normal.   XI: Sternocleidomastoids and trapezii were normal.   XII: Tongue was midline and movements normal. There was no lingual atrophy or fasciculations.   Motor Examination: Muscle bulk, tone and strength were normal.   Sensory Examination: Pinprick, vibration and joint position sense were intact.   Reflexes: DTRs were 1 at the biceps and knees and trace at the ankles.  All other reflexes were absent.  Plantar Responses: Plantar responses were flexor.   Coordination/Cerebellar Function: There was no dysmetria on finger to nose or heel to shin testing.   Gait/Stance: Gait was normal. Romberg was negative.  Tandem was mildly unsteady.

## 2024-06-20 ENCOUNTER — NON-APPOINTMENT (OUTPATIENT)
Age: 74
End: 2024-06-20

## 2024-09-30 ENCOUNTER — APPOINTMENT (OUTPATIENT)
Dept: NEUROLOGY | Facility: CLINIC | Age: 74
End: 2024-09-30

## 2024-11-10 NOTE — BRIEF OPERATIVE NOTE - TYPE OF ANESTHESIA
Lipid panel borderline high, liver enzyme ALT borderline high otherwise routine labs are normal  Recommend> due to low cardiovascular risk recommend adopting healthy cardiovascular lifestyle including regular exercise, healthy heart diet and weight reduction of more than 10% of total body weight  Will repeat fasting lipid panel in 1 year
Regional

## 2024-12-27 ENCOUNTER — TRANSCRIPTION ENCOUNTER (OUTPATIENT)
Age: 74
End: 2024-12-27

## 2025-03-24 ENCOUNTER — APPOINTMENT (OUTPATIENT)
Dept: RADIOLOGY | Facility: HOSPITAL | Age: 75
End: 2025-03-24
Payer: MEDICARE

## 2025-03-24 ENCOUNTER — OUTPATIENT (OUTPATIENT)
Dept: OUTPATIENT SERVICES | Facility: HOSPITAL | Age: 75
LOS: 1 days | End: 2025-03-24
Payer: MEDICARE

## 2025-03-24 DIAGNOSIS — Z00.8 ENCOUNTER FOR OTHER GENERAL EXAMINATION: ICD-10-CM

## 2025-03-24 DIAGNOSIS — Z98.890 OTHER SPECIFIED POSTPROCEDURAL STATES: Chronic | ICD-10-CM

## 2025-03-24 DIAGNOSIS — Z96.651 PRESENCE OF RIGHT ARTIFICIAL KNEE JOINT: Chronic | ICD-10-CM

## 2025-03-24 PROCEDURE — 71046 X-RAY EXAM CHEST 2 VIEWS: CPT | Mod: 26

## 2025-03-24 PROCEDURE — 71046 X-RAY EXAM CHEST 2 VIEWS: CPT
